# Patient Record
Sex: MALE | Race: WHITE
[De-identification: names, ages, dates, MRNs, and addresses within clinical notes are randomized per-mention and may not be internally consistent; named-entity substitution may affect disease eponyms.]

---

## 2020-07-23 ENCOUNTER — HOSPITAL ENCOUNTER (OUTPATIENT)
Dept: HOSPITAL 47 - EC | Age: 65
Setting detail: OBSERVATION
LOS: 1 days | Discharge: HOME | End: 2020-07-24
Attending: FAMILY MEDICINE | Admitting: FAMILY MEDICINE
Payer: MEDICARE

## 2020-07-23 VITALS — RESPIRATION RATE: 18 BRPM

## 2020-07-23 DIAGNOSIS — M54.5: ICD-10-CM

## 2020-07-23 DIAGNOSIS — Z81.8: ICD-10-CM

## 2020-07-23 DIAGNOSIS — Z80.0: ICD-10-CM

## 2020-07-23 DIAGNOSIS — Z87.01: ICD-10-CM

## 2020-07-23 DIAGNOSIS — Z82.49: ICD-10-CM

## 2020-07-23 DIAGNOSIS — Z79.1: ICD-10-CM

## 2020-07-23 DIAGNOSIS — Z88.0: ICD-10-CM

## 2020-07-23 DIAGNOSIS — E66.9: ICD-10-CM

## 2020-07-23 DIAGNOSIS — F02.80: ICD-10-CM

## 2020-07-23 DIAGNOSIS — Z86.69: ICD-10-CM

## 2020-07-23 DIAGNOSIS — Z98.890: ICD-10-CM
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
## 2020-07-23 DIAGNOSIS — M47.9: ICD-10-CM

## 2020-07-23 DIAGNOSIS — Z86.010: ICD-10-CM

## 2020-07-23 DIAGNOSIS — M21.372: ICD-10-CM

## 2020-07-23 DIAGNOSIS — G40.409: Primary | ICD-10-CM

## 2020-07-23 DIAGNOSIS — S00.572A: ICD-10-CM

## 2020-07-23 DIAGNOSIS — Z91.013: ICD-10-CM

## 2020-07-23 DIAGNOSIS — K21.9: ICD-10-CM

## 2020-07-23 DIAGNOSIS — M19.042: ICD-10-CM

## 2020-07-23 DIAGNOSIS — M21.371: ICD-10-CM

## 2020-07-23 DIAGNOSIS — G31.9: ICD-10-CM

## 2020-07-23 DIAGNOSIS — Z03.818: ICD-10-CM

## 2020-07-23 DIAGNOSIS — M19.041: ICD-10-CM

## 2020-07-23 DIAGNOSIS — G89.29: ICD-10-CM

## 2020-07-23 DIAGNOSIS — Z87.81: ICD-10-CM

## 2020-07-23 DIAGNOSIS — Z80.42: ICD-10-CM

## 2020-07-23 DIAGNOSIS — I10: ICD-10-CM

## 2020-07-23 LAB
ALBUMIN SERPL-MCNC: 3.9 G/DL (ref 3.5–5)
ALP SERPL-CCNC: 104 U/L (ref 38–126)
ALT SERPL-CCNC: 25 U/L (ref 4–49)
ANION GAP SERPL CALC-SCNC: 7 MMOL/L
AST SERPL-CCNC: 43 U/L (ref 17–59)
BASOPHILS # BLD AUTO: 0 K/UL (ref 0–0.2)
BASOPHILS NFR BLD AUTO: 0 %
BUN SERPL-SCNC: 13 MG/DL (ref 9–20)
CALCIUM SPEC-MCNC: 8.6 MG/DL (ref 8.4–10.2)
CHLORIDE SERPL-SCNC: 107 MMOL/L (ref 98–107)
CO2 SERPL-SCNC: 22 MMOL/L (ref 22–30)
EOSINOPHIL # BLD AUTO: 0.1 K/UL (ref 0–0.7)
EOSINOPHIL NFR BLD AUTO: 2 %
ERYTHROCYTE [DISTWIDTH] IN BLOOD BY AUTOMATED COUNT: 4.89 M/UL (ref 4.3–5.9)
ERYTHROCYTE [DISTWIDTH] IN BLOOD: 12.7 % (ref 11.5–15.5)
GLUCOSE SERPL-MCNC: 150 MG/DL (ref 74–99)
HCT VFR BLD AUTO: 47.5 % (ref 39–53)
HGB BLD-MCNC: 15.7 GM/DL (ref 13–17.5)
KETONES UR QL STRIP.AUTO: (no result)
LYMPHOCYTES # SPEC AUTO: 1.8 K/UL (ref 1–4.8)
LYMPHOCYTES NFR SPEC AUTO: 25 %
MCH RBC QN AUTO: 32 PG (ref 25–35)
MCHC RBC AUTO-ENTMCNC: 32.9 G/DL (ref 31–37)
MCV RBC AUTO: 97.1 FL (ref 80–100)
MONOCYTES # BLD AUTO: 0.3 K/UL (ref 0–1)
MONOCYTES NFR BLD AUTO: 5 %
NEUTROPHILS # BLD AUTO: 4.7 K/UL (ref 1.3–7.7)
NEUTROPHILS NFR BLD AUTO: 66 %
PH UR: 6 [PH] (ref 5–8)
PLATELET # BLD AUTO: 232 K/UL (ref 150–450)
POTASSIUM SERPL-SCNC: 4.4 MMOL/L (ref 3.5–5.1)
PROT SERPL-MCNC: 6.7 G/DL (ref 6.3–8.2)
SODIUM SERPL-SCNC: 136 MMOL/L (ref 137–145)
SP GR UR: 1.01 (ref 1–1.03)
UROBILINOGEN UR QL STRIP: <2 MG/DL (ref ?–2)
WBC # BLD AUTO: 7.2 K/UL (ref 3.8–10.6)

## 2020-07-23 PROCEDURE — 80053 COMPREHEN METABOLIC PANEL: CPT

## 2020-07-23 PROCEDURE — 83036 HEMOGLOBIN GLYCOSYLATED A1C: CPT

## 2020-07-23 PROCEDURE — 99285 EMERGENCY DEPT VISIT HI MDM: CPT

## 2020-07-23 PROCEDURE — 93005 ELECTROCARDIOGRAM TRACING: CPT

## 2020-07-23 PROCEDURE — 72148 MRI LUMBAR SPINE W/O DYE: CPT

## 2020-07-23 PROCEDURE — 70450 CT HEAD/BRAIN W/O DYE: CPT

## 2020-07-23 PROCEDURE — 84443 ASSAY THYROID STIM HORMONE: CPT

## 2020-07-23 PROCEDURE — 70551 MRI BRAIN STEM W/O DYE: CPT

## 2020-07-23 PROCEDURE — 95819 EEG AWAKE AND ASLEEP: CPT

## 2020-07-23 PROCEDURE — 80306 DRUG TEST PRSMV INSTRMNT: CPT

## 2020-07-23 PROCEDURE — 96375 TX/PRO/DX INJ NEW DRUG ADDON: CPT

## 2020-07-23 PROCEDURE — 96365 THER/PROPH/DIAG IV INF INIT: CPT

## 2020-07-23 PROCEDURE — 96361 HYDRATE IV INFUSION ADD-ON: CPT

## 2020-07-23 PROCEDURE — 36415 COLL VENOUS BLD VENIPUNCTURE: CPT

## 2020-07-23 PROCEDURE — 85025 COMPLETE CBC W/AUTO DIFF WBC: CPT

## 2020-07-23 PROCEDURE — 96376 TX/PRO/DX INJ SAME DRUG ADON: CPT

## 2020-07-23 PROCEDURE — 80320 DRUG SCREEN QUANTALCOHOLS: CPT

## 2020-07-23 PROCEDURE — 81003 URINALYSIS AUTO W/O SCOPE: CPT

## 2020-07-23 RX ADMIN — LEVETIRACETAM SCH MLS/HR: 100 INJECTION, SOLUTION, CONCENTRATE INTRAVENOUS at 20:47

## 2020-07-23 RX ADMIN — CEFAZOLIN STA MLS/HR: 330 INJECTION, POWDER, FOR SOLUTION INTRAMUSCULAR; INTRAVENOUS at 15:05

## 2020-07-23 RX ADMIN — CEFAZOLIN STA MLS/HR: 330 INJECTION, POWDER, FOR SOLUTION INTRAMUSCULAR; INTRAVENOUS at 11:48

## 2020-07-23 NOTE — ED
General Adult HPI





- General


Chief complaint: Neuro Symptoms/Deficit


Stated complaint: Unresponsive


Time Seen by Provider: 07/23/20 11:20


Source: patient, EMS, RN notes reviewed


Mode of arrival: EMS


Limitations: altered mental status





- History of Present Illness


Initial comments: 





Patient is a pleasant 65-year-old male presenting to the emergency department 

following an unresponsive episode.  EMS reports that family saw patient with 

clenching of the jaw and abnormal breathing pattern and tonic muscle activity.  

Patient was with decreased responsiveness.  Sudden onset.  No injury.  Patient 

was unresponsive for around 6 minutes.  Family did do bystander CPR.  They did 

not check a pulse prior to this.  When EMS arrived patient did have a pulse and 

was breathing on his own.  Patient was unresponsive and then became combative 

and then became confused.  Patient has slowly improved since that time.  Patient

arrives and is alert and acting appropriately.  Patient does not recall the 

episode.  Patient states he has a history of seizure once many years ago.  

Patient does not take seizure medication.  Patient denies head injury or any 

recent illness.  Patient has no physical complaints at this time however states 

he does feel slightly confused.  Patient agrees that he did bite his tongue 

during examination.  Patient also was incontinent of urine and stool.  Patient 

denies any alcohol or drug use.  Chest pain or dyspnea.





- Related Data


                                Home Medications











 Medication  Instructions  Recorded  Confirmed


 


Ibuprofen [Motrin Ib] 400 mg PO Q6HR PRN 07/23/20 07/23/20











                                    Allergies











Allergy/AdvReac Type Severity Reaction Status Date / Time


 


Penicillins Allergy  Unknown Verified 07/23/20 12:22


 


shellfish derived [Shellfish] Allergy  Unknown Verified 07/23/20 12:22














Review of Systems


ROS Statement: 


Those systems with pertinent positive or pertinent negative responses have been 

documented in the HPI.





ROS Other: All systems not noted in ROS Statement are negative.


Constitutional: Denies: fever


Eyes: Denies: eye pain


ENT: Denies: ear pain


Respiratory: Denies: cough, dyspnea


Cardiovascular: Denies: chest pain


Endocrine: Denies: fatigue


Gastrointestinal: Denies: abdominal pain


Genitourinary: Denies: dysuria


Musculoskeletal: Denies: back pain


Skin: Denies: rash


Neurological: Reports: as per HPI, confusion.  Denies: headache, weakness





Past Medical History


Past Medical History: No Reported History


History of Any Multi-Drug Resistant Organisms: None Reported


Past Surgical History: No Surgical Hx Reported


Past Psychological History: No Psychological Hx Reported


Smoking Status: Never smoker


Past Alcohol Use History: None Reported


Past Drug Use History: None Reported





General Exam


Limitations: altered mental status


General appearance: alert, in no apparent distress


Head exam: Present: atraumatic, normocephalic


Eye exam: Present: normal appearance, PERRL, EOMI


ENT exam: Present: other (Tongue and upper lip abrasion)


Neck exam: Present: normal inspection.  Absent: tenderness


Respiratory exam: Present: normal lung sounds bilaterally


Cardiovascular Exam: Present: regular rate, normal rhythm


GI/Abdominal exam: Present: soft.  Absent: tenderness


Extremities exam: Present: normal inspection, full ROM.  Absent: tenderness


Neurological exam: Present: alert, CN II-XII intact.  Absent: motor sensory 

deficit


  ** Expanded


Neurological exam: Present: protecting the airway


Patient oriented to: Present: person, place.  Absent: time


Speech: Present: fluid speech


Cranial nerves: EOM's Intact: Normal


Motor strength exam: RUE: 5, LUE: 5, RLE: 5, LLE: 5


Eye Response: (4) open spontaneously


Motor Response: (6) obeys commands


Verbal Response: (4) confused conversation


Psychiatric exam: Present: normal affect, normal mood


Skin exam: Present: normal color





Course


                                   Vital Signs











  07/23/20 07/23/20





  11:37 12:46


 


Temperature 98.2 F 


 


Pulse Rate 77 74


 


Respiratory 18 18





Rate  


 


Blood Pressure 139/81 123/85


 


O2 Sat by Pulse 99 95





Oximetry  














EKG Findings





- EKG Comments:


EKG Findings:: Normal sinus rhythm 78.  .  QRS 92.  .  QTc 462.  

Normal QRS.  No acute ST change.





Medical Decision Making





- Medical Decision Making





Patient reevaluated.  Patient resting comfortably in bed.  Patient updated on 

results and plan.  Case was Detail with Dio Wilburn Who Will Admit 

Covered for Hospital Call.





- Lab Data


Result diagrams: 


                                 07/23/20 11:36





                                 07/23/20 11:36


                                   Lab Results











  07/23/20 07/23/20 Range/Units





  11:36 11:36 


 


WBC  7.2   (3.8-10.6)  k/uL


 


RBC  4.89   (4.30-5.90)  m/uL


 


Hgb  15.7   (13.0-17.5)  gm/dL


 


Hct  47.5   (39.0-53.0)  %


 


MCV  97.1   (80.0-100.0)  fL


 


MCH  32.0   (25.0-35.0)  pg


 


MCHC  32.9   (31.0-37.0)  g/dL


 


RDW  12.7   (11.5-15.5)  %


 


Plt Count  232   (150-450)  k/uL


 


Neutrophils %  66   %


 


Lymphocytes %  25   %


 


Monocytes %  5   %


 


Eosinophils %  2   %


 


Basophils %  0   %


 


Neutrophils #  4.7   (1.3-7.7)  k/uL


 


Lymphocytes #  1.8   (1.0-4.8)  k/uL


 


Monocytes #  0.3   (0-1.0)  k/uL


 


Eosinophils #  0.1   (0-0.7)  k/uL


 


Basophils #  0.0   (0-0.2)  k/uL


 


Sodium   136 L  (137-145)  mmol/L


 


Potassium   4.4  (3.5-5.1)  mmol/L


 


Chloride   107  ()  mmol/L


 


Carbon Dioxide   22  (22-30)  mmol/L


 


Anion Gap   7  mmol/L


 


BUN   13  (9-20)  mg/dL


 


Creatinine   0.54 L  (0.66-1.25)  mg/dL


 


Est GFR (CKD-EPI)AfAm   >90  (>60 ml/min/1.73 sqM)  


 


Est GFR (CKD-EPI)NonAf   >90  (>60 ml/min/1.73 sqM)  


 


Glucose   150 H  (74-99)  mg/dL


 


Calcium   8.6  (8.4-10.2)  mg/dL


 


Total Bilirubin   0.7  (0.2-1.3)  mg/dL


 


AST   43  (17-59)  U/L


 


ALT   25  (4-49)  U/L


 


Alkaline Phosphatase   104  ()  U/L


 


Total Protein   6.7  (6.3-8.2)  g/dL


 


Albumin   3.9  (3.5-5.0)  g/dL


 


Serum Alcohol   <10  mg/dL














- Radiology Data


Radiology results: image reviewed (Computed tomography scan of the brain shows 

no acute hemorrhage or shift.  Mild atrophy.)





Disposition


Clinical Impression: 


 New onset seizure





Disposition: ADMITTED AS IP TO THIS HOSP


Is patient prescribed a controlled substance at d/c from ED?: No


Referrals: 


None,Stated [Primary Care Provider] - 1-2 days


Decision Time: 12:53

## 2020-07-23 NOTE — HP
HISTORY AND PHYSICAL



A 65-year-old white male who came to the emergency room, unresponsive episode from

clenching his jaw, abnormal breathing, tonic clonic activity, who already passed out,

as well as urinary incontinence and foaming at the mouth, decreased responsiveness,

postop confusion.  He is unresponsive for about 6 minutes, did some kind of CPR.

Currently sitting up in bed.  His family member says he is kind of confused still, but

as he is eating and drinking now, he is becoming more with it.  He denies any history

of any seizures.  He does not take any medications.  No history of trauma.  When he did

collapse before he started seizing, they apparently had caught him and lowered him to

the ground.  Does not do alcohol or drugs.  He does not take any medicines except over-

the-counter medications.



ALLERGIES:

PENICILLIN, SHELLFISH.



14-POINT REVIEW OF SYSTEMS:

Otherwise negative.



No medical history.



No surgeries.



No heart attacks, strokes.



PHYSICAL EXAM:

Apparently, temp 98.2, blood pressure 120s to 130s/80s, pulse 74, 77, respiratory 18-

20, O2 is 95 to 99.

CARDIOVASCULAR:  S1, S2.

LUNGS: Clear.

GI: Soft.

HEMATOLOGY:  Negative Homans.

PSYCH:  Fair mood and affect.

NEUROLOGIC:  Alert and oriented x3.

OPHTHALMOLOGIC:  Pupils equal, round, reactive to basic commands.  Opens eyes

spontaneously.

His normal mood and affect at this time does not appear confused.



EKG is normal sinus rhythm.



LABS:

Reviewed.  Normal white count 7.2, hemoglobin 15.7, sodium 136, potassium 4.4, BUN 13,

creatinine 0.54.  The CAT scan of the brain is normal.



ASSESSMENT:

New onset seizure.  Get neurology consult, get an EEG.  Apparently, unclear what caused

this. Will have to rule out low blood sugar as a possible cause.  His glucose is 150.

Will do a hemoglobin A1c, rehydrate him overnight, do an EEG.  Please see further

orders. Prognosis guarded.





MMODL / IJN: [id number] / Job#: [id number]

## 2020-07-23 NOTE — P.CNNES
History of Present Illness


Consult date: 20


Requesting physician: Howard Mckinney


Reason for Consult: New onset seizure


History of Present Illness: 





Patient is a 65-year-old male came to the hospital for new onset seizure.  

Patient states that he has been working a lot, cleaning his dad's house, getting

ready for his granddaughter's birthday.  He thinks he overworked.  He woke up 

this morning at 6 AM, was cleaning his garage, then without any warning, he woke

up in the ambulance.  He was later on told that he while picking up stuff, did 

not look right, and they called 911.  As per EMS flowsheet, they were called for

witnessed cardiac arrest with CPR in progress.  Patient was found laying on the 

ground in the garage with family on location.  Family stated the patient has 

been working in the garage when he suddenly started to stare off and then 

collapsed.  The patient became unconscious and stiff with irregular 

respirations.  Family started chest compressions however had not checked for a 

pulse prior to doing so.  Family stated that patient regained consciousness as 

EMS was arriving on the scene.  Patient's GCS initially was 12.  He would 

respond to verbal stimuli but was unable to follow commands and was confused and

mildly combative.  His blood glucose was 140.  Patient complained of severe 

nausea.  While some mild confusion remained, patient's mental status improved 

throughout transport and he became alert and oriented 2 with GCS of 14.  Patien

t had equal  strengths negative stroke workup.  His blood pressure was 

140/95, pulse rate 86, respiration 22, saturation 92%.  Blood glucose was 140.





Patient underwent CT head showed no acute intracranial hemorrhage or midline 

shift.  Mild diffuse age-related cerebral atrophy.  EKG shows normal sinus 

rhythm.  Patient's CBC is normal, sodium 136 potassium 4.4, hepatic panel 

normal.  UA negative.  Urine drug screen negative, serum alcohol level negative.





Patient states that he had a seizure at age 18.  It was felt to be related to 

high fever.  He was not ever placed on seizure medication.  Never had seizure 

thereafter.  Patient denies any tobacco use, alcohol or drugs.  Patient does 

complain of significant back pain.  Patient denies any family history of 

epilepsy.  No history of closed head injury.





Review of Systems





Patient complains of significant arthritis of his hands, back, foot weakness.  

Patient has tongue bite from the seizure.  Complains of chronic low back pain.  

Denies any chest pain shortness of breath wheezing or cough.  Denies double 

vision or loss of vision hearing loss.  Denies hoarseness, sore throat or 

dysphagia.  All other review of systems unremarkable.





Past Medical History


Past Medical History: GERD/Reflux, Hypertension, Osteoarthritis (OA), Pneumonia,

Seizure Disorder


Additional Past Medical History / Comment(s): Seizure many years ago, arthritis 

in multiple joints especially bilateral hands, occasional back pain, past 

migraines.


History of Any Multi-Drug Resistant Organisms: None Reported


Past Surgical History: No Surgical Hx Reported


Additional Past Surgical History / Comment(s): Colonoscopies/benign polypectomy,

nasal fracture with surgical repair.


Past Anesthesia/Blood Transfusion Reactions: No Reported Reaction


Smoking Status: Never smoker





- Past Family History


  ** Father


Family Medical History: Cancer, Dementia


Additional Family Medical History / Comment(s): colon cancer and prostate 

cancer.  Father lived to be 86yrs old.





  ** Mother


Family Medical History: Myocardial Infarction (MI)


Additional Family Medical History / Comment(s): Mother  of a MI at the age 

of 66yrs.





Medications and Allergies


                                Home Medications











 Medication  Instructions  Recorded  Confirmed  Type


 


Ibuprofen [Motrin Ib] 400 mg PO Q6HR PRN 20 History








                                    Allergies











Allergy/AdvReac Type Severity Reaction Status Date / Time


 


Penicillins Allergy  Unknown Verified 20 12:22


 


shellfish derived [Shellfish] Allergy  Unknown Verified 20 12:22














Physical Examination





- Vital Signs


Vital Signs: 


                                   Vital Signs











  Temp Pulse Pulse Resp BP BP Pulse Ox


 


 20 15:35  97.8 F   70  18   120/78  94 L


 


 20 13:59   74   18  120/81   95


 


 20 12:46   74   18  123/85   95


 


 20 11:37  98.2 F  77   18  139/81   99








                                Intake and Output











 20





 06:59 14:59 22:59


 


Other:   


 


  Weight  90.718 kg 90.718 kg














On examination patient is a late middle aged  male, in no acute 

distress.  He is alert and awake fully oriented to time place and person.  

Speech and language functions are normal.  Attention and concentration fund of 

knowledge is adequate.  On cranial nerve examination pupils are round and 

reactive to light.  Visual fields are full on confrontation.  Extraocular muscle

s are intact with no nystagmus.  Face is symmetric, tongue protrudes to the 

midline.  Palatal elevation and sensation normal.  Hearing and shoulder shrug 

normal.  There is evidence of oral trauma with tongue bite adam on the right 

side of the tongue.  On muscle strength testing there is no pronator drift and 

the strength is normal in both arms distally and proximally.  His strength is 

normal in the hip flexion, adduction, abduction knee extension.  His ankle 

dorsiflexion is 2+ on the right, 3+ to 4 on the left.  Plantarflexion is also 4 

on the right, 5 on the left.  Sensory touch is equal.  Reflexes are 1 in the 

upper limbs, 1+ at the knees and left ankle.  Absent right ankle.  Plantars are 

flat.  Sensory to touch is equal.  No ataxia for finger-to-nose testing.  Bulk 

of muscles is decreased in the right leg and right foot.  Gait deferred.  No 

carotid bruit, S1 and S2 audible.  Peripheral pulses present.  Chest is clear 

abdominal soft nontender.





Results





- Laboratory Findings


CBC and BMP: 


                                 20 11:36





                                 20 11:36


Abnormal Lab Findings: 


                                  Abnormal Labs











  20





  11:36 12:57


 


Sodium  136 L 


 


Creatinine  0.54 L 


 


Glucose  150 H 


 


Urine Protein   Trace H


 


Urine Ketones   1+ H














Assessment and Plan


Assessment: 





* New onset seizure.  Patient has history of a solitary seizure at age 18, which

   was felt to be related to fever.  At present, no obvious provoking factor 

  identified.


* Chronic back pain.  Bilateral foot drop, right worse than left.  Possible 

  spinal stenosis/lumbar radiculopathy.


* Obesity.


Plan: 





* Patient will undergo MRI of the brain with and without contrast to evaluate 

  for structural abnormality.  We will also check MRI of the lumbar spine 

  because of presence of bilateral footdrop, possible spinal stenosis.


* EEG to rule out any epileptiform activity.


* Further management will be based upon above test results.

## 2020-07-23 NOTE — CT
EXAMINATION TYPE: CT brain wo con

 

DATE OF EXAM: 7/23/2020

 

HISTORY: Seizure activity

 

CT DLP: 1098.4 mGycm.  Automated Exposure Control for Dose Reduction was Utilized.

 

TECHNIQUE: CT scan of the head is performed without contrast.

 

COMPARISON: None.

 

FINDINGS:   There is no acute intracranial hemorrhage or midline shift identified. There is diffuse v
entricular and sulcal prominence consistent with diffuse age-related cerebral atrophy. Gray-white mat
ter differentiation fairly well-maintained.  Slightly low-lying cerebellar tonsils but not greater th
an 5 mm inferior descent. The calvarium is intact. The globes are intact and the visualized sinuses a
re clear.   

 

IMPRESSION:  No acute intracranial hemorrhage or midline shift.  There is mild diffuse age-related ce
rebral atrophy noted.

## 2020-07-24 VITALS — SYSTOLIC BLOOD PRESSURE: 158 MMHG | HEART RATE: 59 BPM | DIASTOLIC BLOOD PRESSURE: 98 MMHG

## 2020-07-24 VITALS — TEMPERATURE: 97.8 F

## 2020-07-24 LAB — HBA1C MFR BLD: 5.7 % (ref 4–6)

## 2020-07-24 RX ADMIN — LEVETIRACETAM SCH MLS/HR: 100 INJECTION, SOLUTION, CONCENTRATE INTRAVENOUS at 09:00

## 2020-07-24 NOTE — MR
EXAMINATION TYPE: MR brain/lspine wo con

 

DATE OF EXAM: 7/24/2020

 

COMPARISON:

 

HISTORY: Bilateral foot drop, possible spinal stenosis

Weakness.

 

Multiplanar multiecho imaging of the brain was performed without contrast.

 

FINDINGS:

There is mild cerebral atrophy. There is no mass effect nor midline shift. There is no sign of intrac
ranial hemorrhage. Diffusion images show no evidence of cortical infarct. The brainstem is intact. Th
ere is no evidence of posterior fossa mass. There is minimal mucosal thickening in the ethmoid air ce
lls. Corpus callosum is intact. Sella turcica appears normal. Cerebellum is intact. On the FLAIR imag
es there is some mild diffuse increased signal in the white matter around the lateral ventricles with
out a focal measurable lesion. There is no evidence of orbital mass.

 

IMPRESSION:

White matter slight increased signal around the lateral ventricles in the parietal lobes is subtle an
d diffuse of uncertain significance. Minimal atrophy. No focal lesion.

 

 

Minimal ethmoid sinusitis.

 

 

 

MR scan of the lumbar spine.

 

Multiplanar multiecho imaging of the lumbar spine was performed without contrast.

 

FINDINGS:

The lumbar vertebra have normal alignment. There is minimal disc space narrowing at L5-S1. There is r
udimentary disc at S1-S2. There is mild posterior disc bulging at L4-5 and L5-S1. There is mild facet
 arthropathy and lateral recess stenosis at L5-S1 on the right side. There is similar bilateral minim
al lateral recess stenosis at L4-5. There is right side L4-5 neural foraminal narrowing due to disc s
pace narrowing and facet arthropathy. There is no significant spinal canal stenosis. There is no lumb
ar paraspinal mass. I see no bony destructive process. There is no compression fracture.

 

IMPRESSION:

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
 stenosis as above. No significant spinal canal stenosis. No fracture.

## 2020-07-24 NOTE — EEG
ELECTROENCEPHALOGRAM REPORT



DATE OF SERVICE:

07/24/2020



PREAMBLE:

This is a 65-year-old male with new onset seizure.  This study is performed to 
evaluate

for any epileptiform activity.



EEG FINDINGS:

This is a 21 channel routine EEG recording in a patient utilizing 10-20 
international

system with referential and bipolar montages.  The background consists of well

developed, well regulated, moderate voltage activity in 9-10 hertz alpha.  
Background

is posterior dominant and reactive to eye opening and closing.  Intermittent 
bitemporal

rhythmic theta was seen, left more than right.  Occasional runs of sharply 
controlled

theta was seen in the left temporal region.  A single sharp wave was seen in the
left

temporal region in the later part of the study.  Stage 2 sleep was seen with 
appearance

of bilaterally symmetric theta frequency rhythm and some sleep spindles.  Photic

driving response was not seen.  EKG rhythm lead revealed no obvious arrhythmia.



IMPRESSION:

This is an abnormal EEG due to presence of bitemporal slowing, left more than 
right,

suggestive of focal cortical neural dysfunction.  The presence of a few sharply

contoured theta in the left temporal region and a single sharp wave also seen in
the left temporal region implies

underlying cortical irritability and tendency for seizures.  Clinical 
correlation is

recommended.





MMODL / IJN: [id number] / Job#: [id number]

[facility] HospitalKARINA

## 2020-07-24 NOTE — P.PN
Subjective


Progress Note Date: 07/24/20





Patient's somnolent at this time.  Patient was given Ativan for MRI.  Patient's 

daughter was present.  No further seizures.





Objective





- Vital Signs


Vital signs: 


                                   Vital Signs











Temp  97.8 F   07/24/20 12:21


 


Pulse  59 L  07/24/20 16:00


 


Resp  18   07/24/20 16:00


 


BP  158/98   07/24/20 12:21


 


Pulse Ox  97   07/24/20 12:21








                                 Intake & Output











 07/23/20 07/24/20 07/24/20





 18:59 06:59 18:59


 


Intake Total  900 240


 


Output Total   1300


 


Balance  900 -1060


 


Weight 90.718 kg  


 


Intake:   


 


  Intake, IV Titration  900 





  Amount   


 


    Sodium Chloride 0.9% 1,  900 





    000 ml @ 75 mls/hr IV .   





    [id number] Rx#:[id number]   


 


  Oral   240


 


Output:   


 


  Urine   1300


 


Other:   


 


  Voiding Method  Toilet Toilet





  Urinal Urinal


 


  # Voids 1 1 3














- Exam





Patient sedated from Ativan.





- Labs


CBC & Chem 7: 


                                 07/23/20 11:36





                                 07/23/20 11:36





Assessment and Plan


Assessment: 





* New onset seizure.  Patient has history of a solitary seizure at age 18, which

  was felt to be related to fever.  At present, no obvious provoking factor 

  identified.


* Chronic back pain.  Bilateral foot drop, right worse than left.  No evidence 

  of spinal stenosis/lumbar radiculopathy.  Patient probably has peripheral 

  neuropathy.


* Obesity.


Plan: 





* MRI of the brain with and without contrast revealed no acute process.  White 

  matter slight increased signal around the lateral ventricles in the parietal 

  lobes is subtle and diffuse of uncertain significance.  Minimal atrophy.  No 

  focal lesion.  Mild ethmoid sinusitis.  


* MRI of the lumbar spine showed spondylitic changes in the lower lumbar spine 

  with some neural foraminal narrowing and lateral recess stenosis.  No 

  significant spinal canal stenosis.  No fracture.  


* EEG was abnormal due to presence of intermittent bitemporal slowing, left more

  than right, suggestive of focal cortical neuronal dysfunction.  The presence 

  of a few sharply contoured left temporal theta and a single sharp wave also in

  the left temporal region implies underlying cortical irritability and tendency

  for seizures.  Clinical correlation is recommended.  


* Patient already has been started on Keppra 500 mg twice a day.  He will be 

  continued on Keppra 500 mg twice a day.  


* patient was recommended to follow up with neurologist regarding management of 

  his seizure disorder, and also perhaps for EMG and nerve conduction studies of

  bilateral lower extremities, to evaluate for peripheral neuropathy.


* Patient's daughter was informed of [place] state law of no driving unless s

  eizure free for 6 months, climbing ladders, operating dangerous machinery or 

  unsupervised swimming.  As patient was asleep, patient's daughter was 

  recommended to relay information to the patient.  Also discussed with the 

  nurse about these restrictions.


* Neurologically clear for discharge.

## 2021-10-28 NOTE — P.CNPUL
History of Present Illness


Consult date: 10/28/21


Reason for consult: dyspnea, cough, hypoxemia, abnormal CXR/CT


Chief complaint: Fever, cough, fatigue


History of present illness: 


 This is a very pleasant 66-year-old male patient with history of hypertension, 

osteoarthritis, previous episode pneumonia, never smoker, seizure disorder, 

patient is not on any prescription medications on a regular basis who presented 

to the emergency department on 10/28/2021 today history of fatigue, cough, 

fever, and worsening dyspnea, patient tested positive for COVID-19 at the urgent

care clinic and he was found to be hypoxic with the pulse ox of 80-90% and was 

sent to the emergency department for further evaluation and treatment.  No 

nausea or vomiting, no abdominal pain, no chest pain.  No history of chronic 

lung disease.  he is a former smoker.  His pulse ox was 88% on room air in the 

emergency department, he did have a fever with temp of 100.1F, he is currently 

on 4 L of oxygen the pulse ox of 90-94%.  He is short of breath with exertion.  

Does have a dry cough.  Chest x-ray showing bilateral increased opacities in the

mid to lower lung greatest at the bases in the periphery consistent with COVID-

19 infection.  CBC was positive for lymphopenia with lymphocyte, 0.6, Coag 

profile was within normal limits, sodium was 136, the residential lites were 

unremarkable, BUN is 24 creatinine 0.58.  His AST was 66, ALT was 30, alkaline 

phosphatase was 62, CRP was 5.8, his LDH level is pending right now, d-dimer has

been ordered and pending.  He was started on Decadron, prophylactic Lovenox, and

we were asked to see the patient in regards to his COVID-19 pneumonia








Review of Systems


All systems: negative


Constitutional: Reports fatigue, Reports weakness, Denies chills, Denies fever


Eyes: denies blurred vision, denies pain


Ears, nose, mouth and throat: Denies headache, Denies sore throat


Cardiovascular: Denies chest pain, Denies shortness of breath


Respiratory: Reports cough, Reports dyspnea


Gastrointestinal: Denies abdominal pain, Denies diarrhea, Denies nausea, Denies 

vomiting


Musculoskeletal: Denies myalgias


Integumentary: Denies pruritus, Denies rash


Neurological: Denies numbness, Denies weakness


Psychiatric: Denies anxiety, Denies depression


Endocrine: Denies fatigue, Denies weight change





Past Medical History


Past Medical History: GERD/Reflux, Hypertension, Osteoarthritis (OA), Pneumonia,

Seizure Disorder


Additional Past Medical History / Comment(s): Seizure many years ago, arthritis 

in multiple joints especially bilateral hands, occasional back pain, past 

migraines.


History of Any Multi-Drug Resistant Organisms: None Reported


Past Surgical History: No Surgical Hx Reported


Additional Past Surgical History / Comment(s): Colonoscopies/benign polypectomy,

nasal fracture with surgical repair.


Past Anesthesia/Blood Transfusion Reactions: No Reported Reaction


Past Psychological History: No Psychological Hx Reported


Smoking Status: Never smoker


Past Alcohol Use History: None Reported


Past Drug Use History: None Reported





- Past Family History


  ** Father


Family Medical History: Cancer, Dementia


Additional Family Medical History / Comment(s): colon cancer and prostate 

cancer.  Father lived to be 86yrs old.





  ** Mother


Family Medical History: Myocardial Infarction (MI)


Additional Family Medical History / Comment(s): Mother  of a MI at the age 

of 66yrs.





Medications and Allergies


                                Home Medications











 Medication  Instructions  Recorded  Confirmed  Type


 


No Known Home Medications  10/28/21 10/28/21 History








                                    Allergies











Allergy/AdvReac Type Severity Reaction Status Date / Time


 


Penicillins Allergy  Unknown Verified 10/28/21 12:52


 


shellfish derived [Shellfish] Allergy  Unknown Verified 10/28/21 12:52














Physical Exam


Vitals: 


                                   Vital Signs











  Temp Pulse Resp BP Pulse Ox


 


 10/28/21 14:36   81    94 L


 


 10/28/21 12:51  98.1 F  77  18   90 L


 


 10/28/21 11:42    20  


 


 10/28/21 11:19  100.1 F H     88 L


 


 10/28/21 10:51  99.0 F  99  18  121/82  93 L








                                Intake and Output











 10/28/21 10/28/21 10/28/21





 06:59 14:59 22:59


 


Other:   


 


  Weight  102.058 kg 











 GENERAL EXAM: Alert, very pleasant, 66-year-old white male, on 4 L of oxygen 

pulse ox is 90-94% comfortable in no apparent distress.


HEAD: Normocephalic/atraumatic.


EYES: Normal reaction of pupils, equal size.  Conjunctiva pink, sclera white.


NOSE: Clear with pink turbinates.


THROAT: No erythema or exudates.


NECK: No masses, no JVD, no thyroid enlargement, no adenopathy.


CHEST: No chest wall deformity.  Symmetrical expansion. 


LUNGS: Equal air entry with crackles at bilateral bases


CVS: Regular rate and rhythm, normal S1 and S2, no gallops, no murmurs, no rubs


ABDOMEN: Soft, nontender.  No hepatosplenomegaly, normal bowel sounds, no 

guarding or rigidity.


EXTREMITIES: No clubbing, no edema, no cyanosis, 2+ pulses and upper and lower 

extremities.


MUSCULOSKELETAL: Muscle strength and tone normal.


SPINE: No scoliosis or deformity


SKIN: No rashes


CENTRAL NERVOUS SYSTEM: Alert and oriented -3.  No focal deficits, tone is 

normal in all 4 extremities.


PSYCHIATRIC: Alert and oriented -3.  Appropriate affect.  Intact judgment and 

insight.











Results





- Laboratory Findings


CBC and BMP: 


                                 10/28/21 11:37





                                 10/28/21 11:37


PT/INR, D-dimer











PT  9.7 sec (9.0-12.0)   10/28/21  11:37    


 


INR  0.9  (<1.2)   10/28/21  11:37    








Abnormal lab findings: 


                                  Abnormal Labs











  10/28/21 10/28/21





  11:37 11:37


 


Lymphocytes #  0.6 L 


 


Sodium   136 L


 


BUN   24 H


 


Creatinine   0.58 L


 


Glucose   156 H


 


AST   66 H


 


C-Reactive Protein   5.8 H


 


Total Protein   6.1 L


 


Albumin   3.2 L














- Diagnostic Findings


Chest x-ray: report reviewed, image reviewed





Assessment and Plan


Plan: 


 Assessment:





#1.  Acute hypoxic respiratory failure related to COVID-19 pneumonia, onset of 

symptoms was 2 days prior to presentation.  Patient is a non-vaccinated 

individual.  He is on 4 L of supplemental oxygen, he is a good candidate for Re

mdesivir and we will start it today on 10/28/2021





#2.  Dyspnea, fever, cough related to the above





#3.  Hypertension





#4.  Osteoarthritis





#5.  Previous episode of pneumonia





#6.  Seizure disorder, has not had episode of seizure in many years not on any 

maintenance medications





#7.  Former smoker





#8.  Migraine headaches





Plan:





We'll start the patient on Remdesivir


We'll start colchicine, continue Lovenox


Vitamin C, zinc and vitamin D


We will obtain a d-dimer and follow inflammatory markers


We'll continue supportive treatment, monitor for any worsening dyspnea or 

hypoxia


We'll continue to follow





I performed a history & physical examination of the patient and discussed their 

management with my nurse practitioner, Ayse Mendoza.  I reviewed the nurse 

practitioner's note and agree with the documented findings and plan of care.  

Lung sounds are positive for bibasilar crackles throughout the lung fields.  The

findings and the impression was discussed with the patient.  I attest to the d

ocumentation by the nurse practitioner. 


Time with Patient: Greater than 30

## 2021-10-28 NOTE — ED
URI HPI





- General


Chief Complaint: Upper Respiratory Infection


Stated Complaint: Covid+/sob/cough


Time Seen by Provider: 10/28/21 10:56


Source: patient, RN notes reviewed


Mode of arrival: ambulatory


Limitations: no limitations





- History of Present Illness


Initial Comments: 





Patient is a 66-year-old male with history of hypertension, seizure disorder, 

presenting to emergency Department with complaints of some shortness of breath. 

Patient tested positive for Covid today urgent care and they sent him in for 

further violation acid oxygen levels at 90%.  He does complain of some mild 

shortness of breath.  He states his symptoms started just a couple days ago with

a cough, fatigue.  He denies having a fever prior to today.  Denies any nausea 

or vomiting, no abdominal pain, no chest pain at this time.  Patient denies 

history of asthma or COPD, he is a former smoker.  Patient has no further 

complaints.  Patient's vital signs upon arrival was temperature 99.0, 93% on 

room air.  Patient's vitals were rechecked shortly later and he was at 88% on 

room air.





- Related Data


                                Home Medications











 Medication  Instructions  Recorded  Confirmed


 


No Known Home Medications  10/28/21 10/28/21











                                    Allergies











Allergy/AdvReac Type Severity Reaction Status Date / Time


 


Penicillins Allergy  Unknown Verified 10/28/21 12:52


 


shellfish derived [Shellfish] Allergy  Unknown Verified 10/28/21 12:52














Review of Systems


ROS Statement: 


Those systems with pertinent positive or pertinent negative responses have been 

documented in the HPI.





ROS Other: All systems not noted in ROS Statement are negative.





Past Medical History


Past Medical History: GERD/Reflux, Hypertension, Osteoarthritis (OA), Pneumonia,

 Seizure Disorder


Additional Past Medical History / Comment(s): Seizure many years ago, arthritis 

in multiple joints especially bilateral hands, occasional back pain, past mi

graines.


History of Any Multi-Drug Resistant Organisms: None Reported


Past Surgical History: No Surgical Hx Reported


Additional Past Surgical History / Comment(s): Colonoscopies/benign polypectomy,

 nasal fracture with surgical repair.


Past Anesthesia/Blood Transfusion Reactions: No Reported Reaction


Past Psychological History: No Psychological Hx Reported


Smoking Status: Never smoker


Past Alcohol Use History: None Reported


Past Drug Use History: None Reported





- Past Family History


  ** Father


Family Medical History: Cancer, Dementia


Additional Family Medical History / Comment(s): colon cancer and prostate 

cancer.  Father lived to be 86yrs old.





  ** Mother


Family Medical History: Myocardial Infarction (MI)


Additional Family Medical History / Comment(s): Mother  of a MI at the age 

of 66yrs.





General Exam





- General Exam Comments


Initial Comments: 





GENERAL: 


Patient is well-developed and well-nourished.  Patient is nontoxic and in no 

acute distress.





HEAD: 


Atraumatic, normocephalic.





EYES:


Pupils equal round and reactive to light, extraocular movements intact, sclera 

anicteric, conjunctiva are normal.  Eyelids were unremarkable.





ENT: 


Nares patent, oropharynx clear without exudates.  Moist mucous membranes.





NECK: 


Normal range of motion, supple without lymphadenopathy or JVD.





LUNGS:


Unlabored respirations.  Breath sounds clear to auscultation bilaterally and 

equal.  No wheezes rales or rhonchi.





HEART:


Regular rate and rhythm without murmurs, rubs or gallops.





ABDOMEN: 


Soft, nontender, normoactive bowel sounds.  No guarding, no rebound.  No masses 

appreciated.





MUSCULOSKELETAL: 


Normal extremities with adequate strength and normal range of motion, no pitting

 or edema.  No clubbing or cyanosis.





NEUROLOGICAL: 


Patient is alert and oriented x 3.  





SKIN:


 Warm, Dry, normal turgor, no rashes or lesions noted.


Limitations: no limitations





Course


                                   Vital Signs











  10/28/21 10/28/21 10/28/21





  10:51 11:19 11:42


 


Temperature 99.0 F 100.1 F H 


 


Pulse Rate 99  


 


Respiratory 18  20





Rate   


 


Blood Pressure 121/82  


 


O2 Sat by Pulse 93 L 88 L 





Oximetry   














  10/28/21





  12:51


 


Temperature 98.1 F


 


Pulse Rate 77


 


Respiratory 18





Rate 


 


Blood Pressure 


 


O2 Sat by Pulse 90 L





Oximetry 














Medical Decision Making





- Medical Decision Making





Patient is a 66-year-old male presenting with shortness of breath over the past 

couple days.  He was diagnosed with Covid today at urgent care.  He initially 

read at 93% on room air but vitals rechecked shortly later and he was 84% of 

room air.  He is currently on 4 L of oxygen at 90%.  His x-ray shows bilateral 

increased opacities to the mid and lower lung consistent with Covid infection.  

Labs are within normal limits.  Patient will be admitted for Covid pneumonia, 

hypoxia.  Patient sent in by Dr. Pimentel, with consult for pulmonology.  Case d

iscussed with Dr. Lugo.





- Lab Data


Result diagrams: 


                                 10/28/21 11:37





                                 10/28/21 11:37


                                   Lab Results











  10/28/21 10/28/21 10/28/21 Range/Units





  11:37 11:37 11:37 


 


WBC  7.1    (3.8-10.6)  k/uL


 


RBC  5.03    (4.30-5.90)  m/uL


 


Hgb  15.8    (13.0-17.5)  gm/dL


 


Hct  47.5    (39.0-53.0)  %


 


MCV  94.4    (80.0-100.0)  fL


 


MCH  31.4    (25.0-35.0)  pg


 


MCHC  33.3    (31.0-37.0)  g/dL


 


RDW  12.7    (11.5-15.5)  %


 


Plt Count  217    (150-450)  k/uL


 


MPV  9.1    


 


Neutrophils %  87    %


 


Lymphocytes %  8    %


 


Monocytes %  3    %


 


Eosinophils %  0    %


 


Basophils %  0    %


 


Neutrophils #  6.2    (1.3-7.7)  k/uL


 


Lymphocytes #  0.6 L    (1.0-4.8)  k/uL


 


Monocytes #  0.2    (0-1.0)  k/uL


 


Eosinophils #  0.0    (0-0.7)  k/uL


 


Basophils #  0.0    (0-0.2)  k/uL


 


PT   9.7   (9.0-12.0)  sec


 


INR   0.9   (<1.2)  


 


APTT   24.2   (22.0-30.0)  sec


 


Sodium    136 L  (137-145)  mmol/L


 


Potassium    4.0  (3.5-5.1)  mmol/L


 


Chloride    105  ()  mmol/L


 


Carbon Dioxide    23  (22-30)  mmol/L


 


Anion Gap    8  mmol/L


 


BUN    24 H  (9-20)  mg/dL


 


Creatinine    0.58 L  (0.66-1.25)  mg/dL


 


Est GFR (CKD-EPI)AfAm    >90  (>60 ml/min/1.73 sqM)  


 


Est GFR (CKD-EPI)NonAf    >90  (>60 ml/min/1.73 sqM)  


 


Glucose    156 H  (74-99)  mg/dL


 


Calcium    8.8  (8.4-10.2)  mg/dL


 


Total Bilirubin    0.5  (0.2-1.3)  mg/dL


 


AST    66 H  (17-59)  U/L


 


ALT    30  (4-49)  U/L


 


Alkaline Phosphatase    62  ()  U/L


 


Total Protein    6.1 L  (6.3-8.2)  g/dL


 


Albumin    3.2 L  (3.5-5.0)  g/dL














Disposition


Clinical Impression: 


 Pneumonia due to COVID-19 virus, Hypoxia





Disposition: ADMITTED AS IP TO THIS HOSP


Condition: Stable


Referrals: 


None,Stated [Primary Care Provider] - 1-2 days


Decision Date: 10/28/21


Decision Time: 13:09

## 2021-10-28 NOTE — XR
EXAMINATION TYPE: XR chest 1V portable

 

DATE OF EXAM: 10/28/2021

 

COMPARISON: NONE

 

HISTORY: Cough and congestion.

 

TECHNIQUE: Single portable frontal view of the chest is obtained.

 

FINDINGS:  There is bilateral increased opacities in the mid to lower lungs greatest in the periphery
.  No pleural effusion or pneumothorax seen. The cardiac silhouette size is within normal limits.   T
he osseous structures are intact.

 

IMPRESSION: Bilateral increased opacities mid to lower lung greatest in the bases in the periphery co
nsistent with covid-19 infection.

## 2021-10-29 NOTE — P.HPIM
<Shaun Wong - Last Filed: 10/29/21 12:06>





History of Present Illness


H&P Date: 10/29/21





History of Presenting Illness:





Patient is a very pleasant 66-year-old male with a past medical history of 

former nicotine use, hypertension, and seizure disorder.  He presented to the 

emergency department on 10/28/21 with a chief complaint of shortness of breath. 

Patient reportedly began experiencing cough, fatigue, and shortness of breath a 

few days ago and went to the urgent care where he reportedly tested positive for

Covid 19 virus and was found to be hypoxic with SpO2 of 90% and sent to the ER 

for further evaluation.  Upon arrival to the emergency department patient was 

found to be hypoxic at 88% on room air and have a low-grade temp of 100.1F.  

Patient currently requiring oxygen supplementation 5 L O2 with SpO2 of 91%. A 

chest x-ray was completed revealing bilateral increased opacities mid to lower 

lung. Patient was started on Remdesivir, zinc, vitamin C, vitamin D, Lovenox, 

and Decadron.  Labs completed revealing slightly elevated d-dimer of 0.61 and 

CRP 5.8.  Patient admitted under our services with consultation to pulmonology. 

Patient seen and fully evaluated at the bedside this morning he reports 

shortness of breath with rest significantly increasing with minimal exertion.  

He states this is accompanied by a dry cough and body aches.  Patient denies 

having any headache, lightheadedness, dizziness, chest pain, palpitations, 

abdominal pain, nausea, vomiting, or experiencing any 

numbness/tingling/weakness/swelling in his extremities.  Patient reports he c

ontinues to have a good appetite and denies having loss of taste or smell.  

Patient is unvaccinated for Covid 19 virus.





Review of systems:





Pertinent positives and negatives as discussed in HPI, a complete review of 

systems was performed and all other systems are negative.





Physical exam:





Vital signs reviewed and stable. 


General: Nontoxic, no distress and appears stated age.  


Derm: Skin warm and dry, normal coloration for ethnicity.


Head: Atraumatic, normocephalic and symmetric.  


Eyes: EOMs intact, no lid lag, and anicteric sclera


Mouth: no lip lesions, mucus membranes moist


Cardiovascular: regular rate and rhythm with normal S1S2, no murmur, positive 

posterior tibial pulses bilaterally, and cap refill < 2 seconds.  


Lungs: Respirations even, regular, and unlabored at rest on 5L O2 via NC. Noted 

conversational dyspnea. Lungs diminished with bibasilar crackles. No wheezing or

rhonchi. 


Abdominal: soft, nontender to palpation, no guarding, no appreciable or

ganomegaly


Ext: ROM intact. No gross muscle atrophy, no edema, no contractures


Neuro: Speech clear, face symmetrical and CN II-XII grossly intact with no noted

focal neuro deficits


Psych: Alert and oriented to person, place, time, and situation. Appropriate and

pleasant affect. 





Assessment and Plan of Care:





Acute Respiratory Failure with Hypoxia secondary to Covid 19 pneumonia. 


-Oxygenation to be administered and titrated as needed to maintain SPO2 equal to

or greater than 90%


-Telemetry monitoring.


-Remdesivir day 


-Zinc, vitamin C and Vitamin D


-Steroids: Decadron dose 2/10


-Pulmonary following, appreciate further recommendations. 


-Follow inflammatory markers with repeat a.m labs. 





Hypertension


Monitor vital signs.  Patient is currently not on any antihypertensive 

medication and currently blood pressure is stable. 





Seizure disorder


Patient reports being seizure-free for many years and no longer taking 

antiepileptic medications.  We will place patient on seizure precautions and 

monitor.








The patient is admitted with an anticipated greater than 2 midnight stay for 

evaluation of Acute hypoxic respiratory failure due to covid pneumonia 


CODE STATUS: Full code


DVT prophylaxis: Lovenox


Discussed with: Patient and RN


Anticipated discharge date: clinical course to determine


Anticipated discharge place: Home


A total of 45 minutes was spent on the care of this complex patient more than 

50% of the time was spent in counseling and care coordination.














Past Medical History


Past Medical History: GERD/Reflux, Hypertension, Osteoarthritis (OA), Pneumonia,

Seizure Disorder


Additional Past Medical History / Comment(s): Seizure many years ago, arthritis 

in multiple joints especially bilateral hands, occasional back pain, past 

migraines.


History of Any Multi-Drug Resistant Organisms: None Reported


Past Surgical History: No Surgical Hx Reported


Additional Past Surgical History / Comment(s): Colonoscopies/benign polypectomy,

nasal fracture with surgical repair.


Past Anesthesia/Blood Transfusion Reactions: No Reported Reaction


Past Psychological History: No Psychological Hx Reported


Smoking Status: Never smoker


Past Alcohol Use History: None Reported


Past Drug Use History: None Reported





- Past Family History


  ** Father


Family Medical History: Cancer, Dementia


Additional Family Medical History / Comment(s): colon cancer and prostate 

cancer.  Father lived to be 86yrs old.





  ** Mother


Family Medical History: Myocardial Infarction (MI)


Additional Family Medical History / Comment(s): Mother  of a MI at the age 

of 66yrs.





Medications and Allergies


                                Home Medications











 Medication  Instructions  Recorded  Confirmed  Type


 


No Known Home Medications  10/28/21 10/28/21 History








                                    Allergies











Allergy/AdvReac Type Severity Reaction Status Date / Time


 


Penicillins Allergy  Unknown Verified 10/28/21 12:52


 


shellfish derived [Shellfish] Allergy  Unknown Verified 10/28/21 12:52














Physical Exam


Vitals: 


                                   Vital Signs











  Temp Pulse Pulse Resp BP BP Pulse Ox


 


 10/29/21 06:03  97.7 F   72    147/97  88 L


 


 10/29/21 01:58  97.9 F   67    118/77  91 L


 


 10/28/21 22:42        89 L


 


 10/28/21 22:40  97.4 F L   72    134/93  85 L


 


 10/28/21 18:48  98 F  80   18  121/83   98


 


 10/28/21 18:41  97.8 F  75   18  116/85   90 L


 


 10/28/21 16:33  98.6 F  76   16  115/84   93 L


 


 10/28/21 14:36   81      94 L


 


 10/28/21 12:51  98.1 F  77   18    90 L


 


 10/28/21 11:42     20   


 


 10/28/21 11:19  100.1 F H       88 L


 


 10/28/21 10:51  99.0 F  99   18  121/82   93 L








                                Intake and Output











 10/28/21 10/29/21 10/29/21





 22:59 06:59 14:59


 


Other:   


 


  # Voids  1 


 


  # Bowel Movements  0 














Results


CBC & Chem 7: 


                                 10/28/21 11:37





                                 10/28/21 11:37


Labs: 


                  Abnormal Lab Results - Last 24 Hours (Table)











  10/28/21 10/28/21 10/28/21 Range/Units





  11:37 11:37 15:51 


 


Lymphocytes #  0.6 L    (1.0-4.8)  k/uL


 


D-Dimer    0.61 H  (<0.60)  mg/L FEU


 


Sodium   136 L   (137-145)  mmol/L


 


BUN   24 H   (9-20)  mg/dL


 


Creatinine   0.58 L   (0.66-1.25)  mg/dL


 


Glucose   156 H   (74-99)  mg/dL


 


AST   66 H   (17-59)  U/L


 


C-Reactive Protein   5.8 H   (<1.0)  mg/dL


 


Total Protein   6.1 L   (6.3-8.2)  g/dL


 


Albumin   3.2 L   (3.5-5.0)  g/dL














Thrombosis Risk Factor Assmnt





- Choose All That Apply


Each Risk Factor Represents 2 Points: Age 61-74 years


Other congenital or acquired thrombophilia - If yes, enter type in comment: No


Thrombosis Risk Factor Assessment Total Risk Factor Score: 2


Thrombosis Risk Factor Assessment Level: Low Risk





<Su Will - Last Filed: 10/29/21 20:41>





History of Present Illness


Patient seen and examined independently.  Patient was also seen by Shaun Wong NP and case was discussed.  I am in agreement with subjective, physical exam, 

assessment and plan as written above and amended below.





Patient is feeling better than on admission, breathing is easier.  Feels much 

better with O2 on.  He is aware of how he got COVID- from a 16 year old. His 

daughter and granddaughter has not tested positive as well.  I have informed him

 on the possibility of monoclonal antibody for high risk contacts and need for 

quarantine.





General: non toxic, no distress, appears at stated age


Derm: warm, dry


Head: atraumatic, normocephalic, symmetric


Eyes: EOMI, no lid lag, anicteric sclera, sunken eyes


Mouth: no lip lesion, mucus membranes dry


Cardiovascular: S1S2 reg, no murmur, positive posterior tibial pulse bilateral, 


Lungs: [Coarse breath sounds bilateral, no rhonchi, no rales , no accessory 

muscle use


Abdominal: soft,  nontender to palpation, no guarding, no appreciable org

anomegaly


Ext: no gross muscle atrophy, no edema, no contractures


Neuro:  CN II-XI grossly intact, no focal neuro deficits


Psych: Alert, oriented, appropriate affect 








Physical Exam


Osteopathic Statement: *.  No significant issues noted on an osteopathic 

structural exam other than those noted in the History and Physical/Consult.


Vitals: 


                                   Vital Signs











  Temp Pulse Resp BP Pulse Ox


 


 10/29/21 17:17  97.5 F L  77  18  125/83  92 L


 


 10/29/21 16:47      92 L


 


 10/29/21 14:09  97.7 F  78  18  128/84  89 L


 


 10/29/21 09:42  97.5 F L  74  18  114/78  91 L


 


 10/29/21 06:03  97.7 F  72   147/97  88 L


 


 10/29/21 01:58  97.9 F  67   118/77  91 L


 


 10/28/21 22:42      89 L


 


 10/28/21 22:40  97.4 F L  72   134/93  85 L








                                Intake and Output











 10/29/21 10/29/21 10/29/21





 06:59 14:59 22:59


 


Intake Total   250


 


Balance   250


 


Intake:   


 


  Intake, IV Titration   250





  Amount   


 


    Remdesivir 100 mg In   250





    Sodium Chloride 0.9% 250   





    ml @ 250 mls/hr IVPB   





    DAILY@[facility] Rx#:   





    [id number]   


 


Other:   


 


  # Voids 1  


 


  # Bowel Movements 0  














Results


CBC & Chem 7: 


                                 10/28/21 11:37





                                 10/28/21 11:37

## 2021-10-29 NOTE — P.PN
Subjective


Progress Note Date: 10/29/21


Principal diagnosis: 





COVID-19 pneumonia





 This is a very pleasant 66-year-old male patient with history of hypertension, 

osteoarthritis, previous episode pneumonia, never smoker, seizure disorder, 

patient is not on any prescription medications on a regular basis who presented 

to the emergency department on 10/28/2021 today history of fatigue, cough, 

fever, and worsening dyspnea, patient tested positive for COVID-19 at the urgent

care clinic and he was found to be hypoxic with the pulse ox of 80-90% and was 

sent to the emergency department for further evaluation and treatment.  No 

nausea or vomiting, no abdominal pain, no chest pain.  No history of chronic 

lung disease.  he is a former smoker.  His pulse ox was 88% on room air in the 

emergency department, he did have a fever with temp of 100.1F, he is currently 

on 4 L of oxygen the pulse ox of 90-94%.  He is short of breath with exertion.  

Does have a dry cough.  Chest x-ray showing bilateral increased opacities in the

mid to lower lung greatest at the bases in the periphery consistent with COVID-

19 infection.  CBC was positive for lymphopenia with lymphocyte, 0.6, Coag 

profile was within normal limits, sodium was 136, the residential lites were 

unremarkable, BUN is 24 creatinine 0.58.  His AST was 66, ALT was 30, alkaline 

phosphatase was 62, CRP was 5.8, his LDH level is pending right now, d-dimer has

been ordered and pending.  He was started on Decadron, prophylactic Lovenox, and

we were asked to see the patient in regards to his COVID-19 pneumonia








The patient is seen today 10/29/2021 in follow-up on the regular medical floor. 

He is currently awake and alert in no acute distress.  Sitting up in a chair at 

the bedside.  He is up to 5 L high flow nasal cannula to maintain O2 saturation 

in the low 90s.  This is day #2 of Remdesivir.  He remains on colcrys, Decadron,

Lovenox, vitamin supplements.





Objective





- Vital Signs


Vital signs: 


                                   Vital Signs











Temp  97.5 F L  10/29/21 09:42


 


Pulse  74   10/29/21 09:42


 


Resp  18   10/29/21 09:42


 


BP  114/78   10/29/21 09:42


 


Pulse Ox  91 L  10/29/21 09:42








                                 Intake & Output











 10/28/21 10/29/21 10/29/21





 18:59 06:59 18:59


 


Weight 102.058 kg  


 


Other:   


 


  # Voids  1 


 


  # Bowel Movements  0 














- Exam





GENERAL EXAM: Alert, very pleasant, 66-year-old male patient, on 5 L of oxygen 

pulse ox is 91% comfortable in no apparent distress.


HEAD: Normocephalic/atraumatic.


EYES: Normal reaction of pupils, equal size.  Conjunctiva pink, sclera white.


NOSE: Clear with pink turbinates.


THROAT: No erythema or exudates.


NECK: No masses, no JVD, no thyroid enlargement, no adenopathy.


CHEST: No chest wall deformity.  Symmetrical expansion. 


LUNGS: Equal air entry with crackles at bilateral bases


CVS: Regular rate and rhythm, normal S1 and S2, no gallops, no murmurs, no rubs


ABDOMEN: Soft, nontender.  No hepatosplenomegaly, normal bowel sounds, no guardi

ng or rigidity.


EXTREMITIES: No clubbing, no edema, no cyanosis, 2+ pulses and upper and lower 

extremities.


MUSCULOSKELETAL: Muscle strength and tone normal.


SPINE: No scoliosis or deformity


SKIN: No rashes


CENTRAL NERVOUS SYSTEM: No focal deficits, tone is normal in all 4 extremities.


PSYCHIATRIC: Alert and oriented -3.  Appropriate affect.  Intact judgment and 

insight.











- Labs


CBC & Chem 7: 


                                 10/28/21 11:37





                                 10/28/21 11:37


Labs: 


                  Abnormal Lab Results - Last 24 Hours (Table)











  10/28/21 10/28/21 Range/Units





  11:37 15:51 


 


D-Dimer   0.61 H  (<0.60)  mg/L FEU


 


Sodium  136 L   (137-145)  mmol/L


 


BUN  24 H   (9-20)  mg/dL


 


Creatinine  0.58 L   (0.66-1.25)  mg/dL


 


Glucose  156 H   (74-99)  mg/dL


 


AST  66 H   (17-59)  U/L


 


C-Reactive Protein  5.8 H   (<1.0)  mg/dL


 


Total Protein  6.1 L   (6.3-8.2)  g/dL


 


Albumin  3.2 L   (3.5-5.0)  g/dL














Assessment and Plan


Assessment: 





1  Acute hypoxic respiratory failure related to COVID-19 pneumonia, onset of 

symptoms was 2 days prior to presentation.  Patient is a non-vaccinated 

individual.  He is on 5 L of supplemental oxygen, Remdesivir started on 

10/28/2021





2  Dyspnea, fever, cough related to the above





3  Hypertension





4  Osteoarthritis





5  Previous episode of pneumonia





6  Seizure disorder, has not had episode of seizure in many years not on any 

maintenance medications





7  Former smoker





8  Migraine headaches





Plan:





The patient was seen and evaluated by Dr. Yap


Continue the current treatment plan


The day #2 of Remdesivir


Continue Colcrys, Lovenox, Decadron, vitamin supplements


Titrate the FiO2 as tolerated


Follow-up chest x-ray and inflammatory markers in the a.m.


We will continue to follow





I, the cosigning physician, performed a history & physical examination of the 

patient. Lungs sounds with crackles in the bilateral posterior bases.  

Maintaining good O2 saturations in the 90s on 5 L/m per nasal cannula  I 

discussed the assessment and plan of care with my nurse practitioner, Vira Eastman. I attest to the above note as dictated by her.

## 2021-10-30 NOTE — P.PN
Subjective


Progress Note Date: 10/30/21


Principal diagnosis: 





COVID-19 pneumonia





 This is a very pleasant 66-year-old male patient with history of hypertension, 

osteoarthritis, previous episode pneumonia, never smoker, seizure disorder, 

patient is not on any prescription medications on a regular basis who presented 

to the emergency department on 10/28/2021 today history of fatigue, cough, 

fever, and worsening dyspnea, patient tested positive for COVID-19 at the urgent

care clinic and he was found to be hypoxic with the pulse ox of 80-90% and was 

sent to the emergency department for further evaluation and treatment.  No 

nausea or vomiting, no abdominal pain, no chest pain.  No history of chronic 

lung disease.  he is a former smoker.  His pulse ox was 88% on room air in the 

emergency department, he did have a fever with temp of 100.1F, he is currently 

on 4 L of oxygen the pulse ox of 90-94%.  He is short of breath with exertion.  

Does have a dry cough.  Chest x-ray showing bilateral increased opacities in the

mid to lower lung greatest at the bases in the periphery consistent with COVID-

19 infection.  CBC was positive for lymphopenia with lymphocyte, 0.6, Coag 

profile was within normal limits, sodium was 136, the residential lites were 

unremarkable, BUN is 24 creatinine 0.58.  His AST was 66, ALT was 30, alkaline 

phosphatase was 62, CRP was 5.8, his LDH level is pending right now, d-dimer has

been ordered and pending.  He was started on Decadron, prophylactic Lovenox, and

we were asked to see the patient in regards to his COVID-19 pneumonia








The patient is seen today 10/29/2021 in follow-up on the regular medical floor. 

He is currently awake and alert in no acute distress.  Sitting up in a chair at 

the bedside.  He is up to 5 L high flow nasal cannula to maintain O2 saturation 

in the low 90s.  This is day #2 of Remdesivir.  He remains on colcrys, Decadron,

Lovenox, vitamin supplements.





The patient is seen today 10/30/2021 in follow-up on the regular medical floor. 

He is awake and alert in no acute distress.  Sitting up in a chair at the 

bedside.  Breathing easier today compared to yesterday.  He was up as high as 9 

L high flow nasal cannula.  Currently at 8 L.  96% oxygen saturation.  He's been

afebrile.  Hemodynamically stable.  Chest x-ray continues to show bibasilar 

opacities.  White count 10.2.  Hemoglobin 15.7.  Lymphocytes 0.9.  D-dimer 0.52.

 Sodium 137.  Potassium 4.7.  Creatinine 0.55.  LDH 1169.  C-reactive protein 

2.9.  Trending down.  Day #3 of Remdesivir.  He remains on Decadron, Colcrys, 

Lovenox, vitamin supplements.





Objective





- Vital Signs


Vital signs: 


                                   Vital Signs











Temp  97.7 F   10/30/21 10:00


 


Pulse  74   10/30/21 10:00


 


Resp  20   10/30/21 10:00


 


BP  132/82   10/30/21 10:00


 


Pulse Ox  96   10/30/21 10:00








                                 Intake & Output











 10/29/21 10/30/21 10/30/21





 18:59 06:59 18:59


 


Intake Total 250  250


 


Balance 250  250


 


Intake:   


 


  Intake, IV Titration 250  250





  Amount   


 


    Remdesivir 100 mg In 250  250





    Sodium Chloride 0.9% 250   





    ml @ 250 mls/hr IVPB   





    DAILY@[facility] Rx#:   





    [id number]   


 


Other:   


 


  # Voids  2 














- Exam





GENERAL EXAM: Alert, very pleasant, 66-year-old male patient, on 8 L of oxygen 

pulse ox is 91% comfortable in no apparent distress.


HEAD: Normocephalic/atraumatic.


EYES: Normal reaction of pupils, equal size.  Conjunctiva pink, sclera white.


NOSE: Clear with pink turbinates.


THROAT: No erythema or exudates.


NECK: No masses, no JVD, no thyroid enlargement, no adenopathy.


CHEST: No chest wall deformity.  Symmetrical expansion. 


LUNGS: Equal air entry with crackles at bilateral bases


CVS: Regular rate and rhythm, normal S1 and S2, no gallops, no murmurs, no rubs


ABDOMEN: Soft, nontender.  No hepatosplenomegaly, normal bowel sounds, no 

guarding or rigidity.


EXTREMITIES: No clubbing, no edema, no cyanosis, 2+ pulses and upper and lower 

extremities.


MUSCULOSKELETAL: Muscle strength and tone normal.


SPINE: No scoliosis or deformity


SKIN: No rashes


CENTRAL NERVOUS SYSTEM: No focal deficits, tone is normal in all 4 extremities.


PSYCHIATRIC: Alert and oriented -3.  Appropriate affect.  Intact judgment and 

insight.











- Labs


CBC & Chem 7: 


                                 10/30/21 11:40





                                 10/30/21 11:40


Labs: 


                  Abnormal Lab Results - Last 24 Hours (Table)











  10/30/21 10/30/21 Range/Units





  11:40 11:40 


 


Neutrophils #  8.6 H   (1.3-7.7)  k/uL


 


Lymphocytes #  0.9 L   (1.0-4.8)  k/uL


 


BUN   31 H  (9-20)  mg/dL


 


Creatinine   0.55 L  (0.66-1.25)  mg/dL


 


Glucose   124 H  (74-99)  mg/dL


 


Lactate Dehydrogenase   1169 H  (313-618)  U/L


 


C-Reactive Protein   2.9 H  (<1.0)  mg/dL


 


Total Protein   6.2 L  (6.3-8.2)  g/dL


 


Albumin   3.2 L  (3.5-5.0)  g/dL














Assessment and Plan


Assessment: 





1  Acute hypoxic respiratory failure related to COVID-19 pneumonia, onset of 

symptoms was 2 days prior to presentation.  Patient is a non-vaccinated 

individual.  He is on 8 L of supplemental oxygen, Remdesivir started on 

10/28/2021





2  Dyspnea, fever, cough related to the above, improving





3  Hypertension





4  Osteoarthritis





5  Previous episode of pneumonia





6  Seizure disorder, has not had episode of seizure in many years not on any 

maintenance medications





7  Former smoker





8  Migraine headaches





Plan:





The patient was seen and evaluated by Dr. Yap


Chest x-ray and labs reviewed


Day #3 of Remdesivir


Continue Colcrys, Lovenox, Decadron, vitamin supplements


Titrate the FiO2 as tolerated


We will continue to follow





I, the cosigning physician, performed a history & physical examination of the 

patient. Lungs sounds with crackles in the bilateral posterior bases.  

Maintaining good O2 saturations in the 90s on 8 L/m per nasal cannula  I 

discussed the assessment and plan of care with my nurse practitioner, Vira Eastman. I attest to the above note as dictated by her.

## 2021-10-30 NOTE — XR
EXAMINATION TYPE: XR chest 1V portable

 

DATE OF EXAM: 10/30/2021

 

COMPARISON: Chest x-ray 10/28/2021

 

HISTORY: Covid pneumonia

 

TECHNIQUE: Single frontal view of the chest is obtained.

 

FINDINGS:  Bibasilar increased attenuation is present within the lungs. No evident pneumothorax or pl
eural effusion. Cardiac mediastinal silhouette is stable. Apical lucency could be due to underlying e
mphysema.

 

IMPRESSION:  Correlate for pneumonia versus atelectasis.

## 2021-10-30 NOTE — P.PN
Subjective


Progress Note Date: 10/30/21





History of Presenting Illness:





Patient is a very pleasant 66-year-old male with a past medical history of 

former nicotine use, hypertension, and seizure disorder.  He presented to the 

emergency department on 10/28/21 with a chief complaint of shortness of breath. 

Patient reportedly began experiencing cough, fatigue, and shortness of breath a 

few days ago and went to the urgent care where he reportedly tested positive for

Covid 19 virus and was found to be hypoxic with SpO2 of 90% and sent to the ER 

for further evaluation.  Upon arrival to the emergency department patient was 

found to be hypoxic at 88% on room air and have a low-grade temp of 100.1F.  Judah vasquez currently requiring oxygen supplementation 5 L O2 with SpO2 of 91%. A 

chest x-ray was completed revealing bilateral increased opacities mid to lower 

lung. Patient was started on Remdesivir, zinc, vitamin C, vitamin D, Lovenox, 

and Decadron.  Labs completed revealing slightly elevated d-dimer of 0.61 and 

CRP 5.8.  Patient admitted under our services with consultation to pulmonology. 

Patient seen and fully evaluated at the bedside this morning he reports 

shortness of breath with rest significantly increasing with minimal exertion.  

He states this is accompanied by a dry cough and body aches.  Patient denies 

having any headache, lightheadedness, dizziness, chest pain, palpitations, abdom

inal pain, nausea, vomiting, or experiencing any 

numbness/tingling/weakness/swelling in his extremities.  Patient reports he 

continues to have a good appetite and denies having loss of taste or smell.  

Patient is unvaccinated for Covid 19 virus.








Physical exam:





Patient seen and fully evaluated at the bedside this morning.  His oxygenation 

needs have increased and he is up to 9 L high flow nasal cannula with SpO2 

maintaining at 93%.  Patient reports continued dry cough and shortness of breath

with minimal exertion such as adjusting himself in the chair.  Patient reports 

improvement of body aches but states continued dry cough.  He denies having any 

headache, lightheadedness, dizziness, chest pain, palpitations, abdominal pain, 

nausea, vomiting, or experiencing any numbness/tingling/weakness/swelling in his

extremities.  Patient reports he is very anxious as his ex-wife is currently 

down in the emergency department being admitted for Covid Pneumonia and is not 

doing very well. Morning labs delayed and currently pending results.





Vital signs reviewed and stable. 


General: Nontoxic, no distress and appears stated age.  


Derm: Skin warm and dry, normal coloration for ethnicity.


Head: Atraumatic, normocephalic and symmetric.  


Eyes: EOMs intact, no lid lag, and anicteric sclera


Mouth: no lip lesions, mucus membranes moist


Cardiovascular: regular rate and rhythm with normal S1S2, no murmur, positive 

posterior tibial pulses bilaterally, and cap refill < 2 seconds.  


Lungs: Respirations even, regular, and unlabored at rest on 9L O2 via HFNC. 

Noted conversational dyspnea. Lungs diminished with bibasilar crackles. No 

wheezing or rhonchi. 


Abdominal: soft, nontender to palpation, no guarding, no appreciable 

organomegaly


Ext: ROM intact. No gross muscle atrophy, no edema, no contractures


Neuro: Speech clear, face symmetrical and CN II-XII grossly intact with no noted

focal neuro deficits


Psych: Alert and oriented to person, place, time, and situation. Appropriate and

pleasant affect. 





Assessment and Plan of Care:





Acute Respiratory Failure with Hypoxia secondary to Covid 19 pneumonia. 


-Oxygenation to be administered and titrated as needed to maintain SPO2 equal to

or greater than 90%


-Telemetry monitoring.


-Remdesivir day 3/4


-Zinc, vitamin C and Vitamin D


-Steroids: Decadron dose 3/10


-Pulmonary following, appreciate further recommendations. 


-Follow inflammatory markers with repeat a.m labs. 





Hypertension


Monitor vital signs.  Patient is currently not on any antihypertensive 

medication and currently blood pressure is stable. 





Seizure disorder


Patient reports being seizure-free for many years and no longer taking 

antiepileptic medications.  We will place patient on seizure precautions and 

monitor.








The patient is admitted with an anticipated greater than 2 midnight stay for 

evaluation of Acute hypoxic respiratory failure due to covid pneumonia 


CODE STATUS: Full code


DVT prophylaxis: Lovenox


Discussed with: Patient and RN


Anticipated discharge date: clinical course to determine


Anticipated discharge place: Home


A total of 45 minutes was spent on the care of this complex patient more than 

50% of the time was spent in counseling and care coordination.








Objective





- Vital Signs


Vital signs: 


                                   Vital Signs











Temp  97.9 F   10/30/21 05:58


 


Pulse  74   10/30/21 05:58


 


Resp  15   10/30/21 05:58


 


BP  157/94   10/30/21 05:58


 


Pulse Ox  93 L  10/30/21 08:44








                                 Intake & Output











 10/29/21 10/30/21 10/30/21





 18:59 06:59 18:59


 


Intake Total 250  


 


Balance 250  


 


Intake:   


 


  Intake, IV Titration 250  





  Amount   


 


    Remdesivir 100 mg In 250  





    Sodium Chloride 0.9% 250   





    ml @ 250 mls/hr IVPB   





    DAILY@[facility] Rx#:   





    [id number]   


 


Other:   


 


  # Voids  2 














- Labs


CBC & Chem 7: 


                                 10/30/21 11:40





                                 10/28/21 11:37

## 2021-10-31 NOTE — P.PN
Subjective


Progress Note Date: 10/31/21


Principal diagnosis: 





COVID-19 pneumonia





 This is a very pleasant 66-year-old male patient with history of hypertension, 

osteoarthritis, previous episode pneumonia, never smoker, seizure disorder, 

patient is not on any prescription medications on a regular basis who presented 

to the emergency department on 10/28/2021 today history of fatigue, cough, 

fever, and worsening dyspnea, patient tested positive for COVID-19 at the urgent

care clinic and he was found to be hypoxic with the pulse ox of 80-90% and was 

sent to the emergency department for further evaluation and treatment.  No 

nausea or vomiting, no abdominal pain, no chest pain.  No history of chronic 

lung disease.  he is a former smoker.  His pulse ox was 88% on room air in the 

emergency department, he did have a fever with temp of 100.1F, he is currently 

on 4 L of oxygen the pulse ox of 90-94%.  He is short of breath with exertion.  

Does have a dry cough.  Chest x-ray showing bilateral increased opacities in the

mid to lower lung greatest at the bases in the periphery consistent with COVID-

19 infection.  CBC was positive for lymphopenia with lymphocyte, 0.6, Coag 

profile was within normal limits, sodium was 136, the residential lites were 

unremarkable, BUN is 24 creatinine 0.58.  His AST was 66, ALT was 30, alkaline 

phosphatase was 62, CRP was 5.8, his LDH level is pending right now, d-dimer has

been ordered and pending.  He was started on Decadron, prophylactic Lovenox, and

we were asked to see the patient in regards to his COVID-19 pneumonia








The patient is seen today 10/29/2021 in follow-up on the regular medical floor. 

He is currently awake and alert in no acute distress.  Sitting up in a chair at 

the bedside.  He is up to 5 L high flow nasal cannula to maintain O2 saturation 

in the low 90s.  This is day #2 of Remdesivir.  He remains on colcrys, Decadron,

Lovenox, vitamin supplements.





The patient is seen today 10/30/2021 in follow-up on the regular medical floor. 

He is awake and alert in no acute distress.  Sitting up in a chair at the 

bedside.  Breathing easier today compared to yesterday.  He was up as high as 9 

L high flow nasal cannula.  Currently at 8 L.  96% oxygen saturation.  He's been

afebrile.  Hemodynamically stable.  Chest x-ray continues to show bibasilar 

opacities.  White count 10.2.  Hemoglobin 15.7.  Lymphocytes 0.9.  D-dimer 0.52.

 Sodium 137.  Potassium 4.7.  Creatinine 0.55.  LDH 1169.  C-reactive protein 

2.9.  Trending down.  Day #3 of Remdesivir.  He remains on Decadron, Colcrys, 

Lovenox, vitamin supplements.





The patient is seen today 10/03/2021 in follow-up on the regular medical floor. 

He is currently sitting up in a chair at the bedside.  Awake and alert in no 

acute distress.  Doing about the same.  No worse.  He is currently requiring 7 L

high flow nasal cannula to maintain O2 saturation in the 90s.  This is day #4 of

Remdesivir.  .  C-reactive protein 1.0.  D-dimer 0.52.  He is continued 

on Colcrys, Decadron, Lovenox, vitamin supplements.





Objective





- Vital Signs


Vital signs: 


                                   Vital Signs











Temp  98.0 F   10/31/21 06:19


 


Pulse  72   10/31/21 06:19


 


Resp  17   10/31/21 06:19


 


BP  140/93   10/31/21 06:19


 


Pulse Ox  92 L  10/31/21 06:19








                                 Intake & Output











 10/30/21 10/31/21 10/31/21





 18:59 06:59 18:59


 


Intake Total 250  250


 


Balance 250  250


 


Intake:   


 


  Intake, IV Titration 250  250





  Amount   


 


    Remdesivir 100 mg In 250  250





    Sodium Chloride 0.9% 250   





    ml @ 250 mls/hr IVPB   





    DAILY@[facility] Rx#:   





    [id number]   














- Exam





GENERAL EXAM: Alert, very pleasant, 66-year-old male patient, on 7 L of oxygen 

pulse ox is 92% comfortable in no apparent distress.


HEAD: Normocephalic/atraumatic.


EYES: Normal reaction of pupils, equal size.  Conjunctiva pink, sclera white.


NOSE: Clear with pink turbinates.


THROAT: No erythema or exudates.


NECK: No masses, no JVD, no thyroid enlargement, no adenopathy.


CHEST: No chest wall deformity.  Symmetrical expansion. 


LUNGS: Equal air entry with crackles at bilateral bases


CVS: Regular rate and rhythm, normal S1 and S2, no gallops, no murmurs, no rubs


ABDOMEN: Soft, nontender.  No hepatosplenomegaly, normal bowel sounds, no 

guarding or rigidity.


EXTREMITIES: No clubbing, no edema, no cyanosis, 2+ pulses and upper and lower 

extremities.


MUSCULOSKELETAL: Muscle strength and tone normal.


SPINE: No scoliosis or deformity


SKIN: No rashes


CENTRAL NERVOUS SYSTEM: No focal deficits, tone is normal in all 4 extremities.


PSYCHIATRIC: Alert and oriented -3.  Appropriate affect.  Intact judgment and 

insight.











- Labs


CBC & Chem 7: 


                                 10/30/21 11:40





                                 10/30/21 11:40


Labs: 


                  Abnormal Lab Results - Last 24 Hours (Table)











  10/31/21 Range/Units





  07:07 


 


Lactate Dehydrogenase  464 H  (120-246)  U/L


 


C-Reactive Protein  1.00 H  (0.00-0.80)  mg/dL














Assessment and Plan


Assessment: 





1  Acute hypoxic respiratory failure related to COVID-19 pneumonia, onset of 

symptoms was 2 days prior to presentation.  Patient is a non-vaccinated 

individual.  He is on 7 L of supplemental oxygen, Remdesivir started on 

10/28/2021





2  Dyspnea, fever, cough related to the above, improving





3  Hypertension





4  Osteoarthritis





5  Previous episode of pneumonia





6  Seizure disorder, has not had episode of seizure in many years not on any 

maintenance medications





7  Former smoker





8  Migraine headaches





Plan:





The patient was seen and evaluated by Dr. Yap


Currently stable on 7 L nasal cannula


Day #4 of Remdesivir


Continue Colcrys, Lovenox, Decadron, vitamin supplements


Titrate the FiO2 as tolerated


We will continue to follow





I, the cosigning physician, performed a history & physical examination of the 

patient. Lungs sounds with crackles in the bilateral posterior bases.  

Maintaining good O2 saturations in the 90s on 7 L/m per nasal cannula  I 

discussed the assessment and plan of care with my nurse practitioner, Vira Eastman. I attest to the above note as dictated by her.

## 2021-10-31 NOTE — P.PN
Subjective


Progress Note Date: 10/31/21





History of Presenting Illness:





Patient is a very pleasant 66-year-old male with a past medical history of 

former nicotine use, hypertension, and seizure disorder.  He presented to the 

emergency department on 10/28/21 with a chief complaint of shortness of breath. 

Patient reportedly began experiencing cough, fatigue, and shortness of breath a 

few days ago and went to the urgent care where he reportedly tested positive for

Covid 19 virus and was found to be hypoxic with SpO2 of 90% and sent to the ER 

for further evaluation.  Upon arrival to the emergency department patient was 

found to be hypoxic at 88% on room air and have a low-grade temp of 100.1F.  Judah vasquez currently requiring oxygen supplementation 5 L O2 with SpO2 of 91%. A 

chest x-ray was completed revealing bilateral increased opacities mid to lower 

lung. Patient was started on Remdesivir, zinc, vitamin C, vitamin D, Lovenox, 

and Decadron.  Labs completed revealing slightly elevated d-dimer of 0.61 and 

CRP 5.8.  Patient admitted under our services with consultation to pulmonology. 

Patient is unvaccinated for Covid 19 virus.








Physical exam:





Patient seen and fully evaluated at the bedside this morning.  Inflammatory 

markers improving.  D-dimer 0.52, , and CRP of 1.00.  His oxygenation 

also improving as he has decreased from 9 down to 7 L high flow nasal cannula 

and currently maintaining SpO2 at 92%.  Patient reports feeling slightly better 

today and states that his ex-wife was not as sick as he thought and is doing 

much better and may possibly even be discharged home today.  Patient denies 

having any headache, lightheadedness, dizziness, chest pain, palpitations, or 

experiencing any numbness/tingling/weakness/swelling in his extremities.  Vital 

signs stable.





Vital signs reviewed and stable. 


General: Nontoxic, no distress and appears stated age.  


Derm: Skin warm and dry, normal coloration for ethnicity.


Head: Atraumatic, normocephalic and symmetric.  


Eyes: EOMs intact, no lid lag, and anicteric sclera


Mouth: no lip lesions, mucus membranes moist


Cardiovascular: regular rate and rhythm with normal S1S2, no murmur, positive 

posterior tibial pulses bilaterally, and cap refill < 2 seconds.  


Lungs: Respirations even, regular, and unlabored at rest on 7L O2 via HFNC. 

Noted conversational dyspnea. Lungs diminished with bibasilar crackles. No 

wheezing or rhonchi. 


Abdominal: soft, nontender to palpation, no guarding, no appreciable org

anomegaly


Ext: ROM intact. No gross muscle atrophy, no edema, no contractures


Neuro: Speech clear, face symmetrical and CN II-XII grossly intact with no noted

focal neuro deficits


Psych: Alert and oriented to person, place, time, and situation. Appropriate and

pleasant affect. 





Assessment and Plan of Care:





Acute Respiratory Failure with Hypoxia secondary to Covid 19 pneumonia in 

unvaccinated person


-Oxygenation to be administered and titrated as needed to maintain SPO2 equal to

or greater than 90%


-Telemetry monitoring.


-Remdesivir dose 4/5 due today


-Zinc, vitamin C and Vitamin D


-Steroids: Decadron day 4/10


-Pulmonary following, appreciate further recommendations. 


-Follow inflammatory markers with repeat a.m labs. 





Hypertension


Monitor vital signs.  Patient is currently not on any antihypertensive 

medication and currently blood pressure is stable. 





Seizure disorder


Patient reports being seizure-free for many years and no longer taking 

antiepileptic medications.  We will place patient on seizure precautions and 

monitor.








CODE STATUS: Full code


DVT prophylaxis: Lovenox


Discussed with: Patient and RN


Anticipated discharge date: clinical course to determine


Anticipated discharge place: Home


A total of 45 minutes was spent on the care of this complex patient more than 

50% of the time was spent in counseling and care coordination.








Objective





- Vital Signs


Vital signs: 


                                   Vital Signs











Temp  98.0 F   10/31/21 06:19


 


Pulse  72   10/31/21 06:19


 


Resp  17   10/31/21 06:19


 


BP  140/93   10/31/21 06:19


 


Pulse Ox  92 L  10/31/21 06:19








                                 Intake & Output











 10/30/21 10/31/21 10/31/21





 18:59 06:59 18:59


 


Intake Total 250  


 


Balance 250  


 


Intake:   


 


  Intake, IV Titration 250  





  Amount   


 


    Remdesivir 100 mg In 250  





    Sodium Chloride 0.9% 250   





    ml @ 250 mls/hr IVPB   





    DAILY@[facility] Rx#:   





    [id number]   














- Labs


CBC & Chem 7: 


                                 10/30/21 11:40





                                 10/30/21 11:40


Labs: 


                  Abnormal Lab Results - Last 24 Hours (Table)











  10/30/21 10/30/21 Range/Units





  11:40 11:40 


 


Neutrophils #  8.6 H   (1.3-7.7)  k/uL


 


Lymphocytes #  0.9 L   (1.0-4.8)  k/uL


 


ESR  22 H   (0-15)  mm/hr


 


BUN   31 H  (9-20)  mg/dL


 


Creatinine   0.55 L  (0.66-1.25)  mg/dL


 


Glucose   124 H  (74-99)  mg/dL


 


Lactate Dehydrogenase   1169 H  (313-618)  U/L


 


C-Reactive Protein   2.9 H  (<1.0)  mg/dL


 


Total Protein   6.2 L  (6.3-8.2)  g/dL


 


Albumin   3.2 L  (3.5-5.0)  g/dL

## 2021-11-01 NOTE — P.PN
Subjective


Progress Note Date: 11/01/21


Principal diagnosis: 





hypoxia





Feeling well, no subjective sob. No pain, no fevers or chills. 





Objective





- Vital Signs


Vital signs: 


                                   Vital Signs











Temp  97.7 F   11/01/21 09:04


 


Pulse  77   11/01/21 09:04


 


Resp  18   11/01/21 09:04


 


BP  136/85   11/01/21 09:04


 


Pulse Ox  93 L  11/01/21 09:04








                                 Intake & Output











 10/31/21 11/01/21 11/01/21





 18:59 06:59 18:59


 


Intake Total 250  


 


Balance 250  


 


Intake:   


 


  Intake, IV Titration 250  





  Amount   


 


    Remdesivir 100 mg In 250  





    Sodium Chloride 0.9% 250   





    ml @ 250 mls/hr IVPB   





    DAILY@[facility] Rx#:   





    [id number]   


 


Other:   


 


  # Voids  2 














- Exam





Constitutional: No acute distress, conversant, pleasant


Eyes:Anicteric sclerae, moist conjunctiva, no lid-lag, PERRLA, 


ENMT: Oropharynx clear, no erythema, exudates


Neck: Supple, FROM, no masses, or JVD, No carotid bruits, No thyromegaly


Lungs: Clear to auscultation, Clear to percussion, Normal respiratory effort, no

accessory muscle use 


Cardiovascular: Heart regular in rate and rhythm, No murmurs, gallops, or rubs, 

No peripheral edema


Abdominal: Soft, Nontender, no guarding, rebound or rigidity, Normoactive bowel 

sounds, No hepatomegaly, No splenomegaly, No palpable mass 


Skin: Normal temperature, tone, texture, turgor, no induration, No subcutaneous 

nodules, No rash, lesions, No ulcers


Extremities: No digital cyanosis, No clubbing, Pedal pulses intact and 

symmetrical, Radial pulses intact and symmetrical, No calf tenderness 


Psychiatric: Alert and oriented to person, place and time, appropriate affect, 

intact judgement         


Neuro: Muscles Strength 5/5 in all 4 extremities, Sensation to light touch 

grossly present throughout, Cranial nerves II-XII grossly intact, no focal 

sensory deficits








- Labs


CBC & Chem 7: 


                                 10/30/21 11:40





                                 10/30/21 11:40





Assessment and Plan


Plan: 





Acute Respiratory Failure with Hypoxia secondary to Covid 19 pneumonia in un

vaccinated person


-Oxygenation to be administered and titrated as needed to maintain SPO2 equal to

or greater than 90%


-Telemetry monitoring.


-Remdesivir dose 5/5 due today


-Zinc, vitamin C and Vitamin D


-Steroids: Decadron day 5/10


-Pulmonary following, appreciate further recommendations. 


-Follow inflammatory markers with repeat a.m labs. 





Hypertension


Monitor vital signs.  Patient is currently not on any antihypertensive 

medication and currently blood pressure is stable. 





Seizure disorder


Patient reports being seizure-free for many years and no longer taking 

antiepileptic medications.  We will place patient on seizure precautions and 

monitor.








CODE STATUS: Full code


DVT prophylaxis: Lovenox


Discussed with: Patient and RN


Anticipated discharge date: clinical course to determine


Anticipated discharge place: Home


A total of 45 minutes was spent on the care of this complex patient more than 

50% of the time was spent in counseling and care coordination.

## 2021-11-01 NOTE — P.PN
Subjective


Progress Note Date: 11/01/21


Principal diagnosis: 





Coronavirus associated pneumonia.











COVID-19 pneumonia





 This is a very pleasant 66-year-old male patient with history of hypertension, 

osteoarthritis, previous episode pneumonia, never smoker, seizure disorder, 

patient is not on any prescription medications on a regular basis who presented 

to the emergency department on 10/28/2021 today history of fatigue, cough, 

fever, and worsening dyspnea, patient tested positive for COVID-19 at the urgent

care clinic and he was found to be hypoxic with the pulse ox of 80-90% and was 

sent to the emergency department for further evaluation and treatment.  No 

nausea or vomiting, no abdominal pain, no chest pain.  No history of chronic 

lung disease.  he is a former smoker.  His pulse ox was 88% on room air in the 

emergency department, he did have a fever with temp of 100.1F, he is currently 

on 4 L of oxygen the pulse ox of 90-94%.  He is short of breath with exertion.  

Does have a dry cough.  Chest x-ray showing bilateral increased opacities in the

mid to lower lung greatest at the bases in the periphery consistent with COVID-

19 infection.  CBC was positive for lymphopenia with lymphocyte, 0.6, Coag profi

le was within normal limits, sodium was 136, the residential lites were 

unremarkable, BUN is 24 creatinine 0.58.  His AST was 66, ALT was 30, alkaline 

phosphatase was 62, CRP was 5.8, his LDH level is pending right now, d-dimer has

been ordered and pending.  He was started on Decadron, prophylactic Lovenox, and

we were asked to see the patient in regards to his COVID-19 pneumonia








The patient is seen today 10/29/2021 in follow-up on the regular medical floor. 

He is currently awake and alert in no acute distress.  Sitting up in a chair at 

the bedside.  He is up to 5 L high flow nasal cannula to maintain O2 saturation 

in the low 90s.  This is day #2 of Remdesivir.  He remains on colcrys, Decadron,

Lovenox, vitamin supplements.





The patient is seen today 10/30/2021 in follow-up on the regular medical floor. 

He is awake and alert in no acute distress.  Sitting up in a chair at the 

bedside.  Breathing easier today compared to yesterday.  He was up as high as 9 

L high flow nasal cannula.  Currently at 8 L.  96% oxygen saturation.  He's been

afebrile.  Hemodynamically stable.  Chest x-ray continues to show bibasilar 

opacities.  White count 10.2.  Hemoglobin 15.7.  Lymphocytes 0.9.  D-dimer 0.52.

 Sodium 137.  Potassium 4.7.  Creatinine 0.55.  LDH 1169.  C-reactive protein 

2.9.  Trending down.  Day #3 of Remdesivir.  He remains on Decadron, Colcrys, 

Lovenox, vitamin supplements.





The patient is seen today 10/03/2021 in follow-up on the regular medical floor. 

He is currently sitting up in a chair at the bedside.  Awake and alert in no 

acute distress.  Doing about the same.  No worse.  He is currently requiring 7 L

high flow nasal cannula to maintain O2 saturation in the 90s.  This is day #4 of

Remdesivir.  .  C-reactive protein 1.0.  D-dimer 0.52.  He is continued 

on Colcrys, Decadron, Lovenox, vitamin supplements.





Progress note dated 11/01/2021.





The patient was seen again today in room 483.  Currently, he seemed to do 

relatively well.  He sitting up in bed.  On room air, his saturations are 88-

89%.  Today was day 5 of REM.  Clinically, he was feeling better.  Saturations 

on 2 L or 91%.  Temperature, heart rate, respiratory rate, and blood pressure 

are all normal.  No new laboratory data today.  Chest x-ray from October 30 is 

reviewed.





Objective





- Vital Signs


Vital signs: 


                                   Vital Signs











Temp  97.7 F   11/01/21 09:04


 


Pulse  77   11/01/21 09:04


 


Resp  18   11/01/21 09:04


 


BP  136/85   11/01/21 09:04


 


Pulse Ox  91 L  11/01/21 13:24








                                 Intake & Output











 10/31/21 11/01/21 11/01/21





 18:59 06:59 18:59


 


Intake Total 250  


 


Balance 250  


 


Intake:   


 


  Intake, IV Titration 250  





  Amount   


 


    Remdesivir 100 mg In 250  





    Sodium Chloride 0.9% 250   





    ml @ 250 mls/hr IVPB   





    DAILY@1600 [facility] Rx#:   





    [id number]   


 


Other:   


 


  # Voids  2 














- Exam





No acute distress, oriented 3.  Currently on 2 L.  Saturations 91-92%.





HEENT examination is grossly unremarkable.  





Neck supple.  Full range of motion.  No adenopathy thyromegaly or neck vein 

distention.





Cardiovascular examination reveals regular rhythm rate.  S1-S2 normal.  No S3 or

S4.  No discernible murmur noted.  Heart rate 77 bpm.





Lungs reveal mild bilateral scattered rhonchi.  No wheezes or crackles.  Breath 

sounds equal bilaterally.





Abdomen soft bowel sounds are heard.  No masses or tenderness.





Extremities are intact.  No cyanosis clubbing or edema.





Skin is without rash or lesion.





Neurologic examination is brief but nonfocal.





- Labs


CBC & Chem 7: 


                                 10/30/21 11:40





                                 10/30/21 11:40





Assessment and Plan


Assessment: 





Acute hypoxemic respiratory failure secondary to coronavirus associated 

pneumonia.





Hypertension.





Osteoarthritis.





Prior history of pneumonia.





History of seizure disorder.





Previous history of tobacco use.





History of migraine cephalgia.


Plan: 





Plan dated 11/01/2021.





The patient has completed 5 days of REM.  The patient continues on other 

appropriate medications such as Lovenox, Decadron, and vitamin supplementation. 

The patient has been weaned down to 2 L.  From our perspective, the patient cou

ld be considered for possible discharge.  We'll leave that up to the primary 

service.  No additional recommendations are made.  We will continue to follow 

the patient make recommendations where appropriate.


Time with Patient: Less than 30

## 2021-11-02 NOTE — P.DS
Providers


Date of admission: 


10/28/21 12:23





Expected date of discharge: 11/02/21


Attending physician: 


Su Will DO





Consults: 





                                        





10/28/21 13:07


Consult Physician Urgent 


   Consulting Provider: Esa Yap


   Consult Reason/Comments: Covid pneumonia, hypoxia


   Do you want consulting provider notified?: Yes











Primary care physician: 


Stated None





Hospital Course: 





Discharge Diagnosis:





Acute Respiratory Failure with Hypoxia secondary to Covid 19 pneumonia in 

unvaccinated person





Hypertension





Seizure disorder





Hospital Course: 





Patient is a very pleasant 66-year-old male with a past medical history of 

former nicotine use, hypertension, and seizure disorder.  He presented to the 

emergency department on 10/28/21 with a chief complaint of shortness of breath. 

Patient reportedly began experiencing cough, fatigue, and shortness of breath a 

few days ago and went to the urgent care where he reportedly tested positive for

Covid 19 virus and was found to be hypoxic with SpO2 of 90% and sent to the ER 

for further evaluation.  Upon arrival to the emergency department patient was 

found to be hypoxic at 88% on room air and have a low-grade temp of 100.1F. 

Patient required oxygen supplementation. A chest x-ray was completed revealing 

bilateral increased opacities mid to lower lung. Patient was started on 

Remdesivir, zinc, vitamin C, vitamin D, Lovenox, and Decadron.  Labs completed 

revealing slightly elevated d-dimer of 0.61 and CRP 5.8.  Patient admitted under

our services with consultation to pulmonology.  Patient is unvaccinated for 

Covid 19 virus. Pt had 5 day hospitalization and over this time he required high

flow oxygen supplementation as and completed five-day course of Remdesivir. Pt 

has been successfully weaned off of oxygen and is maintaining SPO2 at 91%  at 

rest and 90% or above with ambulation.  Patient is being discharged home with 

vitamin C, vitamin D, zinc, and Decadron 6 mg daily for an additional 5 day 

course to complete 10 day course of steroids. Pt instructed to maintain 

isolation as we discussed from 14 days of positive test on 10/28/21 as well as 

being symptom-free for 2 days with earliest time out of isolation being 

11/10/21. Patient encouraged to get Covid vaccination in 90 days. He is 

currently medically stable for discharge home and has also been cleared by 

pulmonary. Pt instructed to follow-up outpatient with PCP and pulmonology.





Physical exam:





Patient seen and fully evaluated at the bedside this morning. He is on room air 

and reports feeling great this morning.  Patient states that he is ready to go 

home.  Patient ambulatory in room without any difficulties he does continue to 

report some mild shortness of breath with exertion, but states this is easily 

controlled by taking a break and resting.  Ambulatory pulse ox was completed and

patient maintaining SpO2 greater than 90% at all times.  Patient medically 

stable for discharge home at this time.  He denies having any headache, 

lightheadedness, dizziness, chest pain, palpitations, or experiencing any 

numbness/tingling/weakness in his extremities.





Vital signs reviewed and stable. 


General: Nontoxic, no distress and appears stated age.  


Derm: Skin warm and dry, normal coloration for ethnicity.


Head: Atraumatic, normocephalic and symmetric.  


Eyes: EOMs intact, no lid lag, and anicteric sclera


Mouth: no lip lesions, mucus membranes moist


Cardiovascular: regular rate and rhythm with normal S1S2, no murmur, positive 

posterior tibial pulses bilaterally, and cap refill < 2 seconds.  


Lungs: Respirations even, regular, and unlabored at rest on 7L O2 via HFNC. 

Noted conversational dyspnea. Lungs diminished with bibasilar crackles. No w

heezing or rhonchi. 


Abdominal: soft, nontender to palpation, no guarding, no appreciable 

organomegaly


Ext: ROM intact. No gross muscle atrophy, no edema, no contractures


Neuro: Speech clear, face symmetrical and CN II-XII grossly intact with no noted

focal neuro deficits


Psych: Alert and oriented to person, place, time, and situation. Appropriate and

pleasant affect.











A total of 45 minutes of time were spent preparing this complex discharge 

summary.








Patient Condition at Discharge: Stable





Plan - Discharge Summary


Discharge Rx Participant: No


New Discharge Prescriptions: 


New


   Zinc Sulfate [Orazinc] 220 mg PO DAILY 30 Days #30 cap


   Cholecalciferol [Vitamin D3 (10 Mcg = 400 Iu)] 10 mcg PO DAILY 30 Days #30 

tablet


   Dexamethasone [Decadron] 6 mg PO DAILY 5 Days #5 tablet


   Ascorbic Acid [Vitamin C] 1,000 mg PO DAILY 30 Days #60 tab


Discharge Medication List





Ascorbic Acid [Vitamin C] 1,000 mg PO DAILY 30 Days #60 tab 11/02/21 [Rx]


Cholecalciferol [Vitamin D3 (10 Mcg = 400 Iu)] 10 mcg PO DAILY 30 Days #30 

tablet 11/02/21 [Rx]


Dexamethasone [Decadron] 6 mg PO DAILY 5 Days #5 tablet 11/02/21 [Rx]


Zinc Sulfate [Orazinc] 220 mg PO DAILY 30 Days #30 cap 11/02/21 [Rx]








Follow up Appointment(s)/Referral(s): 


Esa Yap MD [STAFF PHYSICIAN] - 11/30/21 10:00 am


Gunner Mejia MD [REFERRING] - 11/15/21 1:20 pm (Please arrive 15 minutes 

early to fill out paperwork.  Bring Insurance cards and Picture ID.  Also Bring 

Discharge packet from the hospital.)


Patient Instructions/Handouts:  Seizure/Epilepsy Discharge Instructions & 

Follow-Up, Coronavirus Disease 2019 (COVID-19)


Activity/Diet/Wound Care/Special Instructions: 





Activity:





As tolerated.  Take breaks as needed.





Diet: 





Heart healthy and carb consistent diet. Avoid salts, or foods with hidden salts 

such as canned or boxed foods and frozen dinners. Extra salt makes your heart 

work harder and traps the fluid in your body for longer.





Special Instructions:  





Take all of your medications as directed and remember to keep all of your 

doctor's appointments and follow-up as needed.  





Thank you for allowing us to participate in your care, it was truly a pleasure 

having you for our patient!!! 





Maintain Isolation as we discussed 14 days from positive test and in addition 

you must be symptom free for 2 days. 





Discharge Disposition: HOME SELF-CARE

## 2021-11-02 NOTE — XR
EXAMINATION TYPE: XR chest 1V portable

 

DATE OF EXAM: 11/2/2021

 

COMPARISON: Chest x-ray 10/30/2021

 

HISTORY: Covid. 19 pneumonia

 

TECHNIQUE: Single frontal view of the chest is obtained.

 

FINDINGS:  Patchy peripheral densities are present predominantly towards the lung bases. No evident p
neumothorax or pleural effusion. Cardiac mediastinal silhouette is stable.

 

IMPRESSION:  Findings consistent with patient's history.

## 2021-11-02 NOTE — P.PN
Subjective


Progress Note Date: 11/02/21


Principal diagnosis: 





Coronavirus associated pneumonia.











COVID-19 pneumonia





 This is a very pleasant 66-year-old male patient with history of hypertension, 

osteoarthritis, previous episode pneumonia, never smoker, seizure disorder, 

patient is not on any prescription medications on a regular basis who presented 

to the emergency department on 10/28/2021 today history of fatigue, cough, 

fever, and worsening dyspnea, patient tested positive for COVID-19 at the urgent

care clinic and he was found to be hypoxic with the pulse ox of 80-90% and was 

sent to the emergency department for further evaluation and treatment.  No 

nausea or vomiting, no abdominal pain, no chest pain.  No history of chronic 

lung disease.  he is a former smoker.  His pulse ox was 88% on room air in the 

emergency department, he did have a fever with temp of 100.1F, he is currently 

on 4 L of oxygen the pulse ox of 90-94%.  He is short of breath with exertion.  

Does have a dry cough.  Chest x-ray showing bilateral increased opacities in the

mid to lower lung greatest at the bases in the periphery consistent with COVID-

19 infection.  CBC was positive for lymphopenia with lymphocyte, 0.6, Coag profi

le was within normal limits, sodium was 136, the residential lites were 

unremarkable, BUN is 24 creatinine 0.58.  His AST was 66, ALT was 30, alkaline 

phosphatase was 62, CRP was 5.8, his LDH level is pending right now, d-dimer has

been ordered and pending.  He was started on Decadron, prophylactic Lovenox, and

we were asked to see the patient in regards to his COVID-19 pneumonia








The patient is seen today 10/29/2021 in follow-up on the regular medical floor. 

He is currently awake and alert in no acute distress.  Sitting up in a chair at 

the bedside.  He is up to 5 L high flow nasal cannula to maintain O2 saturation 

in the low 90s.  This is day #2 of Remdesivir.  He remains on colcrys, Decadron,

Lovenox, vitamin supplements.





The patient is seen today 10/30/2021 in follow-up on the regular medical floor. 

He is awake and alert in no acute distress.  Sitting up in a chair at the 

bedside.  Breathing easier today compared to yesterday.  He was up as high as 9 

L high flow nasal cannula.  Currently at 8 L.  96% oxygen saturation.  He's been

afebrile.  Hemodynamically stable.  Chest x-ray continues to show bibasilar 

opacities.  White count 10.2.  Hemoglobin 15.7.  Lymphocytes 0.9.  D-dimer 0.52.

 Sodium 137.  Potassium 4.7.  Creatinine 0.55.  LDH 1169.  C-reactive protein 

2.9.  Trending down.  Day #3 of Remdesivir.  He remains on Decadron, Colcrys, 

Lovenox, vitamin supplements.





The patient is seen today 10/03/2021 in follow-up on the regular medical floor. 

He is currently sitting up in a chair at the bedside.  Awake and alert in no 

acute distress.  Doing about the same.  No worse.  He is currently requiring 7 L

high flow nasal cannula to maintain O2 saturation in the 90s.  This is day #4 of

Remdesivir.  .  C-reactive protein 1.0.  D-dimer 0.52.  He is continued 

on Colcrys, Decadron, Lovenox, vitamin supplements.





Progress note dated 11/01/2021.





The patient was seen again today in room 483.  Currently, he seemed to do 

relatively well.  He sitting up in bed.  On room air, his saturations are 88-

89%.  Today was day 5 of REM.  Clinically, he was feeling better.  Saturations 

on 2 L or 91%.  Temperature, heart rate, respiratory rate, and blood pressure 

are all normal.  No new laboratory data today.  Chest x-ray from October 30 is 

reviewed.





Progress note dated 11/02/2021.





The patient is again seen today in room 483.  He is currently on room air.  Is 

not receiving any IV fluids.  His saturations are excellent.  He did complete 5 

days of REM.  Currently, his vital signs are stable.  He might be someone who 

could be considered for possible discharge.  He states his breathing is much 

improved, although he does have shortness of breath on exertion.  There are no 

new labs today.  Chest x-ray today, on November 2, shows basilar infiltrates 

consistent with coronavirus associated pneumonia.





Objective





- Vital Signs


Vital signs: 


                                   Vital Signs











Temp  97.7 F   11/02/21 09:51


 


Pulse  66   11/02/21 09:51


 


Resp  24   11/02/21 09:51


 


BP  139/87   11/02/21 09:51


 


Pulse Ox  91 L  11/02/21 11:25








                                 Intake & Output











 11/01/21 11/02/21 11/02/21





 18:59 06:59 18:59


 


Other:   


 


  # Voids 2 1 


 


  # Bowel Movements 1  














- Exam





No acute distress, oriented 3.  Currently on room air.  Saturations are 91%.





HEENT examination is grossly unremarkable.  





Neck supple.  Full range of motion.  No adenopathy thyromegaly or neck vein 

distention.





Cardiovascular examination reveals regular rhythm rate.  S1-S2 normal.  No S3 or

S4.  No discernible murmur noted.  Heart rate 66 bpm.





Lungs reveal mild bilateral scattered rhonchi.  No wheezes or crackles.  Breath 

sounds equal bilaterally.





Abdomen soft bowel sounds are heard.  No masses or tenderness.





Extremities are intact.  No cyanosis clubbing or edema.





Skin is without rash or lesion.





Neurologic examination is brief but nonfocal.





- Labs


CBC & Chem 7: 


                                 10/30/21 11:40





                                 10/30/21 11:40





Assessment and Plan


Assessment: 





Acute hypoxemic respiratory failure secondary to coronavirus associated 

pneumonia.





Hypertension.





Osteoarthritis.





Prior history of pneumonia.





History of seizure disorder.





Previous history of tobacco use.





History of migraine cephalgia.


Plan: 





Plan dated 11/01/2021.





The patient has completed 5 days of REM.  The patient continues on other 

appropriate medications such as Lovenox, Decadron, and vitamin supplementation. 

The patient has been weaned down to 2 L.  From our perspective, the patient 

could be considered for possible discharge.  We'll leave that up to the primary 

service.  No additional recommendations are made.  We will continue to follow 

the patient make recommendations where appropriate.





Plan dated 11/02/2021.





The patient continues to show significant improvement.  At rest, on room air, 

saturations are in the low 90s.  He does have some very mild shortness of breath

on exertion.  The patient's on appropriate medications.  The patient completed 5

days of REM.  The patient could be considered for possible discharge.  We will 

leave that up to the primary service.  No additional recommendations are made at

this time.


Time with Patient: Less than 30